# Patient Record
Sex: FEMALE | Race: WHITE | Employment: FULL TIME | URBAN - METROPOLITAN AREA
[De-identification: names, ages, dates, MRNs, and addresses within clinical notes are randomized per-mention and may not be internally consistent; named-entity substitution may affect disease eponyms.]

---

## 2017-11-24 ENCOUNTER — OFFICE VISIT (OUTPATIENT)
Dept: FAMILY MEDICINE CLINIC | Age: 47
End: 2017-11-24

## 2017-11-24 VITALS
HEIGHT: 66 IN | HEART RATE: 68 BPM | BODY MASS INDEX: 31.24 KG/M2 | DIASTOLIC BLOOD PRESSURE: 88 MMHG | WEIGHT: 194.4 LBS | SYSTOLIC BLOOD PRESSURE: 124 MMHG | TEMPERATURE: 96.8 F | RESPIRATION RATE: 18 BRPM | OXYGEN SATURATION: 96 %

## 2017-11-24 DIAGNOSIS — F41.9 ANXIETY: ICD-10-CM

## 2017-11-24 DIAGNOSIS — F98.8 ATTENTION DEFICIT DISORDER (ADD) WITHOUT HYPERACTIVITY: Primary | ICD-10-CM

## 2017-11-24 DIAGNOSIS — E04.9 GOITER: ICD-10-CM

## 2017-11-24 DIAGNOSIS — E03.9 ACQUIRED HYPOTHYROIDISM: ICD-10-CM

## 2017-11-24 PROCEDURE — 99203 OFFICE O/P NEW LOW 30 MIN: CPT | Performed by: FAMILY MEDICINE

## 2017-11-24 RX ORDER — LEVOTHYROXINE SODIUM 0.05 MG/1
50 TABLET ORAL DAILY
COMMUNITY
End: 2017-12-11 | Stop reason: SDUPTHER

## 2017-11-24 RX ORDER — BUPROPION HYDROCHLORIDE 300 MG/1
300 TABLET ORAL EVERY MORNING
COMMUNITY
End: 2018-06-12

## 2017-11-24 RX ORDER — CHLORAL HYDRATE 500 MG
CAPSULE ORAL DAILY
COMMUNITY
End: 2022-02-21

## 2017-11-24 RX ORDER — MULTIVIT WITH MINERALS/LUTEIN
250 TABLET ORAL DAILY
COMMUNITY
End: 2019-04-12

## 2017-11-24 RX ORDER — DEXTROAMPHETAMINE SACCHARATE, AMPHETAMINE ASPARTATE MONOHYDRATE, DEXTROAMPHETAMINE SULFATE AND AMPHETAMINE SULFATE 6.25; 6.25; 6.25; 6.25 MG/1; MG/1; MG/1; MG/1
25 CAPSULE, EXTENDED RELEASE ORAL EVERY MORNING
Qty: 30 CAPSULE | Refills: 0 | Status: SHIPPED | OUTPATIENT
Start: 2017-11-24 | End: 2017-12-22

## 2017-11-24 RX ORDER — CALCIUM CARBONATE 500(1250)
500 TABLET ORAL DAILY
COMMUNITY
End: 2019-04-12

## 2017-11-24 RX ORDER — MAGNESIUM 30 MG
30 TABLET ORAL 2 TIMES DAILY
COMMUNITY

## 2017-11-24 RX ORDER — TRIAMTERENE AND HYDROCHLOROTHIAZIDE 37.5; 25 MG/1; MG/1
1 TABLET ORAL DAILY
COMMUNITY
End: 2018-03-12 | Stop reason: SDUPTHER

## 2017-11-24 RX ORDER — CETIRIZINE HYDROCHLORIDE 10 MG/1
10 TABLET ORAL DAILY
COMMUNITY

## 2017-11-24 ASSESSMENT — PATIENT HEALTH QUESTIONNAIRE - PHQ9
SUM OF ALL RESPONSES TO PHQ QUESTIONS 1-9: 0
SUM OF ALL RESPONSES TO PHQ9 QUESTIONS 1 & 2: 0
2. FEELING DOWN, DEPRESSED OR HOPELESS: 0
1. LITTLE INTEREST OR PLEASURE IN DOING THINGS: 0

## 2017-11-24 NOTE — PROGRESS NOTES
Subjective:      Patient ID: Mateusz Villa is a 49 y.o. female. [facility]  Dariel Valverde is here to establish as a new patient. Diet: eats well. Vegetarian  Exercise: just resumed exercise program.  Sleeps well 7 hours     She is concerned that she has ADD. She works in [employer] as a  at [employer]. She had trouble staying task, was told was Ag Borges in school. Has trouble with organization, being a good listener, staying on task. Wellbutrin helps her anxiety   Symptoms are life altering enough that she would like to try medication. She has never had a problem with substance abuse. She also has mild OCD. This does not get in the way of functioning. History of hypothyroid. She sees Dr. Constantino Paredes who manages her levothyroxine. PAPs have all been normal. Last PAP about 1 1/2 years ago. Is in a heterosexual relationship. Has occ gone 2 months without a menses. No hot flashes. Review of Systems   Cardiovascular: Positive for leg swelling. Chronic mild pedal edema on days she works and the weather is hot, if sits too much. Diuretic prn is effective. Low salt diet. Musculoskeletal:        Occ self limiting knee pain. Had PT   Psychiatric/Behavioral: Positive for decreased concentration. Negative for dysphoric mood and sleep disturbance. The patient is nervous/anxious. All other systems reviewed and are negative. Objective:   Physical Exam   Constitutional: She is oriented to person, place, and time. Vital signs are normal. She appears well-developed and well-nourished. HENT:   Head: Normocephalic and atraumatic. Right Ear: Hearing, tympanic membrane, external ear and ear canal normal.   Left Ear: Hearing, tympanic membrane, external ear and ear canal normal.   Nose: Nose normal.   Eyes: Conjunctivae and lids are normal.   Neck: Trachea normal. Neck supple. No thyroid mass and no thyromegaly present.    Cardiovascular: Normal rate, regular rhythm, normal heart sounds and normal

## 2017-11-24 NOTE — LETTER
[address] [facility]  [address]  [address] [place] [address]  Phone: [phone]  Fax: [phone]    Karen Arriola MD                                                                                   November 24, 2017                       [place]  [address] [facility] [place] [address]      Dear [facility]: Thank you for enrolling in [address] Please follow the instructions below to securely access your online medical record. Med ePad allows you to send messages to your doctor, view your test results, renew your prescriptions, schedule appointments, and more. How Do I Sign Up? 1. In your Internet browser, go to [email or web address]  2. Click on the Sign Up Now link in the Sign In box. You will see the New Member Sign Up page. 3. Enter your Med ePad Access Code exactly as it appears below. You will not need to use this code after youve completed the sign-up process. If you do not sign up before the expiration date, you must request a new code. Med ePad Access Code: [email or web address]  Expires: 1/23/2018  9:00 AM    4. Enter your Social Security Number ([id number]) and Date of Birth (mm/dd/yyyy) as indicated and click Submit. You will be taken to the next sign-up page. 5. Create a Med ePad ID. This will be your Med ePad login ID and cannot be changed, so think of one that is secure and easy to remember. 6. Create a [employer] e[employer] password. You can change your password at any time. 7. Enter your Password Reset Question and Answer. This can be used at a later time if you forget your password. 8. Enter your e-mail address. You will receive e-mail notification when new information is available in [address] 9. Click Sign Up. You can now view your medical record. Additional Information  If you have questions, please contact the physician practice where you receive care. Remember, Med ePad is NOT to be used for urgent needs. For medical emergencies, dial 911. For questions regarding your [employer] account call [phone]. If you have a clinical question, please call your doctor's office.     Sincerely,  Manda Aldana MD

## 2017-11-24 NOTE — LETTER
disease. Overdose or dangerous interactions with alcohol and other medications may occur, leading to death. Hyperalgesia may develop, in which patients receiving opioids for the treatment of pain may actually become more sensitive to certain painful stimuli, and in some cases, experience pain from ordinarily non-painful stimuli. Women between the ages of 14-53 who could become pregnant should carefully weigh the risks and benefits of opioids with their physicians, as these medications increase the risk of pregnancy complications, including miscarriage,  delivery and stillbirth. It is also possible for babies to be born addicted to opioids. Opioid dependence withdrawal symptoms may include; feelings of uneasiness, increased pain, irritability, belly pain, diarrhea, sweats and goose-flesh. Benzodiazepines and non-benzodiazepine sleep medications: These medications can lead to problems such as addiction/dependence, sedation, fatigue, lightheadedness, dizziness, incoordination, falls, depression, hallucinations, and impaired judgment, memory and concentration. The ability to drive and operate machinery may also be affected. Abnormal sleep-related behaviors have been reported, including sleep walking, driving, making telephone calls, eating, or having sex while not fully awake. These medications can suppress breathing and worsen sleep apnea, particularly when combined with alcohol or other sedating medications, potentially leading to death. Dependence withdrawal symptoms may include tremors, anxiety, hallucinations and seizures. Stimulants:  Common adverse effects include addiction/dependence, increased blood pressure and heart rate, decreased appetite, nausea, involuntary weight loss, insomnia, irritability, and headaches.   These risks may increase when these medications are combined with other stimulants, such as caffeine pills or energy drinks, certain weight loss supplements and oral decongestants. Dependence withdrawal symptoms may include depressed mood, loss of interest, suicidal thoughts, anxiety, fatigue, appetite changes and agitation. Testosterone replacement therapy:  Potential side effects include increased risk of stroke and heart attack, blood clots, increased blood pressure, increased cholesterol, enlarged prostate, sleep apnea, irritability/aggression and other mood disorders, and decreased fertility. Other:     1. I understand that I have the following responsibilities:  · I will take medications at the dose and frequency prescribed. · I will not increase or change how I take my medications without the approval of the health care provider who signs this Medication Agreement. · I will arrange for refills at the prescribed interval ONLY during regular office hours. I will not ask for refills earlier than agreed, after-hours, on holidays or on weekends. · I will obtain all refills for these medications at  ·  _____________[facility]_______________________  pharmacy (phone number  ·  ____[phone]_______________), with full consent for my provider and pharmacist to exchange information in writing or verbally. · I will not request any pain medications or controlled substances from other providers and will inform this provider of all other medications I am taking. · I will inform my other health care providers that I am taking these medications and of the existence of this [address]. In the event of an emergency, I will provide the same information to the emergency department providers. · I will protect my prescriptions and medications. I understand that lost or misplaced prescriptions will not be replaced. · I will keep medications only for my own use and will not share them with others. I will keep all medications away from children.   · I agree to participate in any medical, psychological or psychiatric which may also result in my being prevented from receiving further care from this office. · Other:____________________________________________________________________    AGREEMENT:    I have read the above and have had all of my questions answered. For chronic disease management, I know that my symptoms can be managed with many types of treatments. A chronic medication trial may be part of my treatment, but I must be an active participant in my care. Medication therapy is only one part of my symptom management plan. In some cases, there may be limited scientific evidence to support the chronic use of certain medications to improve symptoms and daily function. Furthermore, in certain circumstances, there may be scientific information that suggests that use of chronic controlled substances may actually worsen my symptoms and increase my risk of unintentional death directly related to this medication therapy. I know that if my provider feels my risk from controlled medications is greater than my benefit, I will have my controlled substance medication(s) compassionately lowered or removed altogether. I agree to a controlled substance medication trial.      I further agree to allow this office to contact family or friends if there are concerns about my safety and use of the controlled medications. I have agreed to use the following medications above as instructed by my physician and as stated in this [address].      Patient Signature:  ______________________  Date:11/24/2017 or _____________    Provider Signature:______________________  Date:11/24/2017 or _____________

## 2017-12-11 ENCOUNTER — PATIENT MESSAGE (OUTPATIENT)
Dept: FAMILY MEDICINE CLINIC | Age: 47
End: 2017-12-11

## 2017-12-11 RX ORDER — LEVOTHYROXINE SODIUM 0.05 MG/1
50 TABLET ORAL DAILY
Qty: 90 TABLET | Refills: 1 | Status: SHIPPED | OUTPATIENT
Start: 2017-12-11 | End: 2018-03-12 | Stop reason: SDUPTHER

## 2017-12-11 NOTE — TELEPHONE ENCOUNTER
From: Manuel Dowell  To: Mari Adkins MD  Sent: 12/11/2017 8:39 AM EST  Subject: Prescription Question    I need a refill of my synthroid 50mcg. Could someone call that in to [place] in [facility]? Thank you!

## 2017-12-22 ENCOUNTER — OFFICE VISIT (OUTPATIENT)
Dept: FAMILY MEDICINE CLINIC | Age: 47
End: 2017-12-22

## 2017-12-22 VITALS
TEMPERATURE: 97.1 F | RESPIRATION RATE: 12 BRPM | BODY MASS INDEX: 31.24 KG/M2 | DIASTOLIC BLOOD PRESSURE: 90 MMHG | SYSTOLIC BLOOD PRESSURE: 144 MMHG | WEIGHT: 195 LBS | HEART RATE: 79 BPM

## 2017-12-22 DIAGNOSIS — F98.8 ATTENTION DEFICIT DISORDER (ADD) WITHOUT HYPERACTIVITY: ICD-10-CM

## 2017-12-22 PROCEDURE — 99214 OFFICE O/P EST MOD 30 MIN: CPT | Performed by: FAMILY MEDICINE

## 2017-12-22 RX ORDER — DEXTROAMPHETAMINE SACCHARATE, AMPHETAMINE ASPARTATE MONOHYDRATE, DEXTROAMPHETAMINE SULFATE AND AMPHETAMINE SULFATE 6.25; 6.25; 6.25; 6.25 MG/1; MG/1; MG/1; MG/1
25 CAPSULE, EXTENDED RELEASE ORAL EVERY MORNING
Qty: 30 CAPSULE | Refills: 0 | Status: CANCELLED | OUTPATIENT
Start: 2018-02-20 | End: 2018-03-22

## 2017-12-22 RX ORDER — DEXTROAMPHETAMINE SACCHARATE, AMPHETAMINE ASPARTATE MONOHYDRATE, DEXTROAMPHETAMINE SULFATE AND AMPHETAMINE SULFATE 7.5; 7.5; 7.5; 7.5 MG/1; MG/1; MG/1; MG/1
30 CAPSULE, EXTENDED RELEASE ORAL EVERY MORNING
Qty: 30 CAPSULE | Refills: 0 | Status: SHIPPED | OUTPATIENT
Start: 2017-12-22 | End: 2018-01-29 | Stop reason: SDUPTHER

## 2017-12-22 RX ORDER — DEXTROAMPHETAMINE SACCHARATE, AMPHETAMINE ASPARTATE MONOHYDRATE, DEXTROAMPHETAMINE SULFATE AND AMPHETAMINE SULFATE 6.25; 6.25; 6.25; 6.25 MG/1; MG/1; MG/1; MG/1
25 CAPSULE, EXTENDED RELEASE ORAL EVERY MORNING
Qty: 30 CAPSULE | Refills: 0 | Status: CANCELLED | OUTPATIENT
Start: 2018-01-21

## 2017-12-22 RX ORDER — DEXTROAMPHETAMINE SACCHARATE, AMPHETAMINE ASPARTATE MONOHYDRATE, DEXTROAMPHETAMINE SULFATE AND AMPHETAMINE SULFATE 6.25; 6.25; 6.25; 6.25 MG/1; MG/1; MG/1; MG/1
25 CAPSULE, EXTENDED RELEASE ORAL EVERY MORNING
Qty: 30 CAPSULE | Refills: 0 | Status: CANCELLED | OUTPATIENT
Start: 2017-12-22

## 2017-12-22 RX ORDER — DEXTROAMPHETAMINE SACCHARATE, AMPHETAMINE ASPARTATE, DEXTROAMPHETAMINE SULFATE AND AMPHETAMINE SULFATE 2.5; 2.5; 2.5; 2.5 MG/1; MG/1; MG/1; MG/1
10-20 TABLET ORAL DAILY
Qty: 60 TABLET | Refills: 0 | Status: SHIPPED | OUTPATIENT
Start: 2017-12-22 | End: 2018-01-29 | Stop reason: SDUPTHER

## 2017-12-22 NOTE — PROGRESS NOTES
1. Attention deficit disorder (ADD) without hyperactivity  amphetamine-dextroamphetamine (ADDERALL XR) 30 MG extended release capsule    amphetamine-dextroamphetamine (ADDERALL, 10MG,) 10 MG tablet          Plan:      Improved but not at goal.  Increase dose Adderal Xr to 30 mg and add short acting. If this is effective; I can see her in 3 months. If not effective, follow up in one month. Side effects of current medications reviewed and questions answered.

## 2018-01-29 ENCOUNTER — PATIENT MESSAGE (OUTPATIENT)
Dept: FAMILY MEDICINE CLINIC | Age: 48
End: 2018-01-29

## 2018-01-29 DIAGNOSIS — F98.8 ATTENTION DEFICIT DISORDER (ADD) WITHOUT HYPERACTIVITY: ICD-10-CM

## 2018-01-29 RX ORDER — DEXTROAMPHETAMINE SACCHARATE, AMPHETAMINE ASPARTATE MONOHYDRATE, DEXTROAMPHETAMINE SULFATE AND AMPHETAMINE SULFATE 7.5; 7.5; 7.5; 7.5 MG/1; MG/1; MG/1; MG/1
30 CAPSULE, EXTENDED RELEASE ORAL DAILY
Qty: 30 CAPSULE | Refills: 0 | Status: SHIPPED | OUTPATIENT
Start: 2018-02-28 | End: 2018-06-12

## 2018-01-29 RX ORDER — DEXTROAMPHETAMINE SACCHARATE, AMPHETAMINE ASPARTATE, DEXTROAMPHETAMINE SULFATE AND AMPHETAMINE SULFATE 2.5; 2.5; 2.5; 2.5 MG/1; MG/1; MG/1; MG/1
10 TABLET ORAL DAILY
Qty: 30 TABLET | Refills: 0 | Status: SHIPPED | OUTPATIENT
Start: 2018-02-28 | End: 2018-06-12

## 2018-01-29 RX ORDER — DEXTROAMPHETAMINE SACCHARATE, AMPHETAMINE ASPARTATE, DEXTROAMPHETAMINE SULFATE AND AMPHETAMINE SULFATE 2.5; 2.5; 2.5; 2.5 MG/1; MG/1; MG/1; MG/1
10-20 TABLET ORAL DAILY
Qty: 60 TABLET | Refills: 0 | Status: SHIPPED | OUTPATIENT
Start: 2018-01-29 | End: 2018-06-12

## 2018-01-29 RX ORDER — DEXTROAMPHETAMINE SACCHARATE, AMPHETAMINE ASPARTATE MONOHYDRATE, DEXTROAMPHETAMINE SULFATE AND AMPHETAMINE SULFATE 7.5; 7.5; 7.5; 7.5 MG/1; MG/1; MG/1; MG/1
30 CAPSULE, EXTENDED RELEASE ORAL EVERY MORNING
Qty: 30 CAPSULE | Refills: 0 | Status: SHIPPED | OUTPATIENT
Start: 2018-01-29 | End: 2018-06-12

## 2018-03-04 ENCOUNTER — PATIENT MESSAGE (OUTPATIENT)
Dept: FAMILY MEDICINE CLINIC | Age: 48
End: 2018-03-04

## 2018-03-04 DIAGNOSIS — F98.8 ATTENTION DEFICIT DISORDER (ADD) WITHOUT HYPERACTIVITY: ICD-10-CM

## 2018-03-05 RX ORDER — DEXTROAMPHETAMINE SACCHARATE, AMPHETAMINE ASPARTATE MONOHYDRATE, DEXTROAMPHETAMINE SULFATE AND AMPHETAMINE SULFATE 7.5; 7.5; 7.5; 7.5 MG/1; MG/1; MG/1; MG/1
30 CAPSULE, EXTENDED RELEASE ORAL EVERY MORNING
Qty: 30 CAPSULE | Refills: 0 | OUTPATIENT
Start: 2018-03-05 | End: 2018-04-04

## 2018-03-05 NOTE — TELEPHONE ENCOUNTER
From: Les Kaur  To: Carlito Ochoa MD  Sent: 3/4/2018 3:42 PM EST  Subject: Prescription Question    I need a refill for my adderall XR 30mg. Could you send that to the [place] on [address] in [facility]? Thank you.

## 2018-03-09 ENCOUNTER — PATIENT MESSAGE (OUTPATIENT)
Dept: FAMILY MEDICINE CLINIC | Age: 48
End: 2018-03-09

## 2018-03-12 RX ORDER — LEVOTHYROXINE SODIUM 0.05 MG/1
50 TABLET ORAL DAILY
Qty: 90 TABLET | Refills: 1 | Status: SHIPPED | OUTPATIENT
Start: 2018-03-12 | End: 2018-06-08

## 2018-03-12 RX ORDER — TRIAMTERENE AND HYDROCHLOROTHIAZIDE 37.5; 25 MG/1; MG/1
1 TABLET ORAL DAILY
Qty: 90 TABLET | Refills: 1 | Status: SHIPPED | OUTPATIENT
Start: 2018-03-12 | End: 2019-01-31 | Stop reason: SDUPTHER

## 2018-06-08 RX ORDER — LEVOTHYROXINE SODIUM 0.05 MG/1
50 TABLET ORAL DAILY
Qty: 90 TABLET | Refills: 0 | Status: SHIPPED | OUTPATIENT
Start: 2018-06-08 | End: 2018-12-10 | Stop reason: SDUPTHER

## 2018-06-11 ENCOUNTER — PATIENT MESSAGE (OUTPATIENT)
Dept: FAMILY MEDICINE CLINIC | Age: 48
End: 2018-06-11

## 2018-06-12 ENCOUNTER — OFFICE VISIT (OUTPATIENT)
Dept: FAMILY MEDICINE CLINIC | Age: 48
End: 2018-06-12

## 2018-06-12 VITALS
OXYGEN SATURATION: 96 % | BODY MASS INDEX: 31.04 KG/M2 | TEMPERATURE: 96.4 F | DIASTOLIC BLOOD PRESSURE: 78 MMHG | RESPIRATION RATE: 16 BRPM | HEART RATE: 78 BPM | SYSTOLIC BLOOD PRESSURE: 118 MMHG | WEIGHT: 193.8 LBS

## 2018-06-12 DIAGNOSIS — J01.00 ACUTE NON-RECURRENT MAXILLARY SINUSITIS: Primary | ICD-10-CM

## 2018-06-12 PROBLEM — F98.8 ATTENTION DEFICIT DISORDER (ADD) WITHOUT HYPERACTIVITY: Status: RESOLVED | Noted: 2017-12-22 | Resolved: 2018-06-12

## 2018-06-12 PROCEDURE — 99213 OFFICE O/P EST LOW 20 MIN: CPT | Performed by: FAMILY MEDICINE

## 2018-06-12 RX ORDER — BENZONATATE 200 MG/1
200 CAPSULE ORAL 3 TIMES DAILY PRN
Qty: 30 CAPSULE | Refills: 1 | Status: SHIPPED | OUTPATIENT
Start: 2018-06-12 | End: 2018-06-19

## 2018-06-19 ENCOUNTER — PATIENT MESSAGE (OUTPATIENT)
Dept: FAMILY MEDICINE CLINIC | Age: 48
End: 2018-06-19

## 2018-06-20 RX ORDER — SULFAMETHOXAZOLE AND TRIMETHOPRIM 800; 160 MG/1; MG/1
1 TABLET ORAL 2 TIMES DAILY
Qty: 20 TABLET | Refills: 0 | Status: SHIPPED | OUTPATIENT
Start: 2018-06-20 | End: 2018-06-30

## 2018-08-10 ENCOUNTER — TELEPHONE (OUTPATIENT)
Dept: FAMILY MEDICINE CLINIC | Age: 48
End: 2018-08-10

## 2018-08-10 NOTE — TELEPHONE ENCOUNTER
Pt c/o mole in groin area, has a hard center, tender to touch scheduled pt an appt w/ Dr. Andre Laird for 8/13/18 @[id number]

## 2018-08-13 ENCOUNTER — OFFICE VISIT (OUTPATIENT)
Dept: FAMILY MEDICINE CLINIC | Age: 48
End: 2018-08-13

## 2018-08-13 VITALS
HEART RATE: 69 BPM | BODY MASS INDEX: 32.42 KG/M2 | TEMPERATURE: 97.3 F | OXYGEN SATURATION: 98 % | WEIGHT: 202.4 LBS | RESPIRATION RATE: 16 BRPM | DIASTOLIC BLOOD PRESSURE: 71 MMHG | SYSTOLIC BLOOD PRESSURE: 119 MMHG

## 2018-08-13 DIAGNOSIS — L98.9 SKIN LESION: Primary | ICD-10-CM

## 2018-08-13 PROCEDURE — 99213 OFFICE O/P EST LOW 20 MIN: CPT | Performed by: FAMILY MEDICINE

## 2018-08-13 NOTE — PROGRESS NOTES
Patient is here for left groin lesion , which was noticed on Thursday. No pain or drainage. It is raised. Non tender . No redness. Review of Systems    ROS: All other systems were reviewed and are negative . Patient's allergies and medications were reviewed. Patient's past medical, surgical, social , and family history were reviewed. OBJECTIVE:  /71   Pulse 69   Temp 97.3 °F (36.3 °C) (Oral)   Resp 16   Wt 202 lb 6.4 oz (91.8 kg)   LMP 07/23/2018 (Approximate)   SpO2 98%   Breastfeeding? No   BMI 32.42 kg/m²     Physical Exam    General: NAD, cooperative, alert and oriented X 3. Mood / affect is good. good insight. well hydrated. Neck : no lymphadenopathy, supple, FROM  CV: Regular rate and rhythm , no murmurs/ rub/ gallop. No edema. Lungs : CTA bilaterally, breathing comfortably  Abdomen: positive bowel sounds, soft , non tender, non distended. No hepatosplenomegaly. No CVA tenderness. Skin: tan color. 5 mm X 10 mm - slightly raised. Non tender. No drainage. ASSESSMENT/  PLAN:  1. Skin lesion  - monitor for changes , including increased size, variation in color/ borders. Advised follow up if occur. Likely seborrheic keratosis.

## 2018-10-26 ENCOUNTER — OFFICE VISIT (OUTPATIENT)
Dept: FAMILY MEDICINE CLINIC | Age: 48
End: 2018-10-26
Payer: OTHER GOVERNMENT

## 2018-10-26 VITALS
TEMPERATURE: 96.6 F | HEIGHT: 66 IN | BODY MASS INDEX: 33.56 KG/M2 | OXYGEN SATURATION: 97 % | DIASTOLIC BLOOD PRESSURE: 85 MMHG | HEART RATE: 72 BPM | SYSTOLIC BLOOD PRESSURE: 122 MMHG | WEIGHT: 208.8 LBS

## 2018-10-26 DIAGNOSIS — Z00.00 WELL ADULT EXAM: ICD-10-CM

## 2018-10-26 DIAGNOSIS — R60.0 PEDAL EDEMA: Primary | ICD-10-CM

## 2018-10-26 DIAGNOSIS — E66.9 CLASS 1 OBESITY WITHOUT SERIOUS COMORBIDITY WITH BODY MASS INDEX (BMI) OF 33.0 TO 33.9 IN ADULT, UNSPECIFIED OBESITY TYPE: ICD-10-CM

## 2018-10-26 PROBLEM — E66.811 CLASS 1 OBESITY WITHOUT SERIOUS COMORBIDITY WITH BODY MASS INDEX (BMI) OF 33.0 TO 33.9 IN ADULT: Status: ACTIVE | Noted: 2018-10-26

## 2018-10-26 PROCEDURE — 99214 OFFICE O/P EST MOD 30 MIN: CPT | Performed by: FAMILY MEDICINE

## 2018-10-26 NOTE — PROGRESS NOTES
Subjective:      Patient ID: Elin Carlson 51 y.o. female. is here for evaluation for swelling      HPI    Joe Barron complains of pedal edema. Better if has socks on; worse in shoes without socks. Does not add salt to her food but eats out 3 to 4 times a week. Does not take NSAIDS. Swelling is better in the am, worse at the end day. Better if is active. Patient denies any exertional chest pain, dyspnea, palpitations, syncope, orthopnea, edema or paroxysmal nocturnal dyspnea. Takes Maxide 3 times a week. Is helpful. Is concerned about weight. Eats pretty well most of the time. Eats 3 meals a day. Does not drink sweet drinks. Has been eating out 4 + times a week. Sleeps 6 hours. Exercise 3 to 4 times a week.        Outpatient Prescriptions Marked as Taking for the 10/26/18 encounter (Office Visit) with Anay Chen MD   Medication Sig Dispense Refill    levothyroxine (SYNTHROID) 50 MCG tablet TAKE 1 TABLET BY MOUTH DAILY 90 tablet 0    triamterene-hydrochlorothiazide (MAXZIDE-25) 37.5-25 MG per tablet Take 1 tablet by mouth daily 90 tablet 1    cetirizine (ZYRTEC) 10 MG tablet Take 10 mg by mouth daily      Omega-3 1000 MG CAPS Take by mouth daily      calcium carbonate (OSCAL) 500 MG TABS tablet Take 500 mg by mouth daily      magnesium 30 MG tablet Take 30 mg by mouth 2 times daily      Calcium Carb-Cholecalciferol (CALCIUM CARBONATE-VITAMIN D3 PO) Take 1 tablet by mouth          Allergies   Allergen Reactions    Ampicillin Hives       Patient Active Problem List   Diagnosis    Acquired hypothyroidism    Goiter    Anxiety       Past Medical History:   Diagnosis Date    Attention deficit disorder (ADD) without hyperactivity 12/22/2017    Blood clot in abdominal vein 07/1994    superior mesentary artery with appendicitis, sepsis    Goiter        Past Surgical History:   Procedure Laterality Date    ABDOMINOPLASTY  10/2013    diastasis rectus, hernia repair    APPENDECTOMY as anticipated.

## 2018-12-10 ENCOUNTER — PATIENT MESSAGE (OUTPATIENT)
Dept: FAMILY MEDICINE CLINIC | Age: 48
End: 2018-12-10

## 2018-12-10 RX ORDER — LEVOTHYROXINE SODIUM 0.05 MG/1
50 TABLET ORAL DAILY
Qty: 90 TABLET | Refills: 2 | Status: SHIPPED | OUTPATIENT
Start: 2018-12-10 | End: 2019-09-09 | Stop reason: SDUPTHER

## 2018-12-10 NOTE — TELEPHONE ENCOUNTER
From: Adrianne Mcadams  To: Sarah Moncada MD  Sent: 12/10/2018 9:36 AM EST  Subject: Prescription Question    Hi I need Dr. Cesar Leyva to send a prescription for my synthroid to [place] on [facility]. thank you

## 2019-01-31 ENCOUNTER — PATIENT MESSAGE (OUTPATIENT)
Dept: FAMILY MEDICINE CLINIC | Age: 49
End: 2019-01-31

## 2019-01-31 RX ORDER — TRIAMTERENE AND HYDROCHLOROTHIAZIDE 37.5; 25 MG/1; MG/1
1 TABLET ORAL DAILY
Qty: 90 TABLET | Refills: 1 | Status: SHIPPED | OUTPATIENT
Start: 2019-01-31 | End: 2019-04-12 | Stop reason: SDUPTHER

## 2019-04-12 ENCOUNTER — TELEPHONE (OUTPATIENT)
Dept: FAMILY MEDICINE CLINIC | Age: 49
End: 2019-04-12

## 2019-04-12 ENCOUNTER — OFFICE VISIT (OUTPATIENT)
Dept: FAMILY MEDICINE CLINIC | Age: 49
End: 2019-04-12
Payer: OTHER GOVERNMENT

## 2019-04-12 VITALS
OXYGEN SATURATION: 96 % | SYSTOLIC BLOOD PRESSURE: 112 MMHG | BODY MASS INDEX: 33.38 KG/M2 | WEIGHT: 208.4 LBS | HEART RATE: 67 BPM | DIASTOLIC BLOOD PRESSURE: 78 MMHG | TEMPERATURE: 97 F

## 2019-04-12 DIAGNOSIS — Z13.1 DIABETES MELLITUS SCREENING: ICD-10-CM

## 2019-04-12 DIAGNOSIS — M22.2X2 PATELLOFEMORAL ARTHRALGIA OF BOTH KNEES: Primary | ICD-10-CM

## 2019-04-12 DIAGNOSIS — M22.2X1 PATELLOFEMORAL ARTHRALGIA OF BOTH KNEES: Primary | ICD-10-CM

## 2019-04-12 DIAGNOSIS — E66.09 CLASS 1 OBESITY DUE TO EXCESS CALORIES WITHOUT SERIOUS COMORBIDITY WITH BODY MASS INDEX (BMI) OF 33.0 TO 33.9 IN ADULT: ICD-10-CM

## 2019-04-12 PROCEDURE — 99213 OFFICE O/P EST LOW 20 MIN: CPT | Performed by: FAMILY MEDICINE

## 2019-04-12 RX ORDER — TRIAMTERENE AND HYDROCHLOROTHIAZIDE 37.5; 25 MG/1; MG/1
1 TABLET ORAL DAILY
Qty: 90 TABLET | Refills: 1 | Status: SHIPPED | OUTPATIENT
Start: 2019-04-12 | End: 2019-10-05 | Stop reason: SDUPTHER

## 2019-04-12 NOTE — PROGRESS NOTES
Subjective:      Patient ID: Parker Bergeron 51 y.o. female. is here for evaluation for concern for DM      HPI    Her partner heard a podcast that said frequent urination was a symptom of DM. She also notes numbness in her hands. Only when she holds her phone. Has swelling in her legs; Maxide helps. Eats out almost every day; just started weight watchers so is eating out less. No blurred vision but occ has white blurry spots. Saw the ophthalmologist who said her eyes are fine. Exercising 3 to 5 times a week. FH negative for DM. Several years ago saw Dr Jami Georges for OA knees. Had symvisc several times. Helped temporarily. Last injection 6 years ago; got better when she quit running. Recently notes pain walking up and down steps. No swelling.      Outpatient Medications Marked as Taking for the 4/12/19 encounter (Office Visit) with J Carlos Iyer MD   Medication Sig Dispense Refill    triamterene-hydrochlorothiazide (MAXZIDE-25) 37.5-25 MG per tablet Take 1 tablet by mouth daily 90 tablet 1    levothyroxine (SYNTHROID) 50 MCG tablet Take 1 tablet by mouth Daily 90 tablet 2    cetirizine (ZYRTEC) 10 MG tablet Take 10 mg by mouth daily      Omega-3 1000 MG CAPS Take by mouth daily      magnesium 30 MG tablet Take 30 mg by mouth 2 times daily          Allergies   Allergen Reactions    Ampicillin Hives       Patient Active Problem List   Diagnosis    Acquired hypothyroidism    Goiter    Anxiety    Class 1 obesity without serious comorbidity with body mass index (BMI) of 33.0 to 33.9 in adult       Past Medical History:   Diagnosis Date    Attention deficit disorder (ADD) without hyperactivity 12/22/2017    Blood clot in abdominal vein 07/1994    superior mesentary artery with appendicitis, sepsis    Goiter        Past Surgical History:   Procedure Laterality Date    ABDOMINOPLASTY  10/2013    diastasis rectus, hernia repair    APPENDECTOMY  07/1994        Family History   Problem

## 2019-04-15 DIAGNOSIS — Z13.1 DIABETES MELLITUS SCREENING: ICD-10-CM

## 2019-04-15 LAB
ANION GAP SERPL CALCULATED.3IONS-SCNC: 13 MMOL/L (ref 3–16)
BUN BLDV-MCNC: 10 MG/DL (ref 7–20)
CALCIUM SERPL-MCNC: 9.2 MG/DL (ref 8.3–10.6)
CHLORIDE BLD-SCNC: 102 MMOL/L (ref 99–110)
CO2: 25 MMOL/L (ref 21–32)
CREAT SERPL-MCNC: 0.8 MG/DL (ref 0.6–1.1)
GFR AFRICAN AMERICAN: >60
GFR NON-AFRICAN AMERICAN: >60
GLUCOSE BLD-MCNC: 93 MG/DL (ref 70–99)
POTASSIUM SERPL-SCNC: 4.2 MMOL/L (ref 3.5–5.1)
SODIUM BLD-SCNC: 140 MMOL/L (ref 136–145)

## 2019-04-16 LAB
ESTIMATED AVERAGE GLUCOSE: 116.9 MG/DL
HBA1C MFR BLD: 5.7 %

## 2019-04-17 PROBLEM — R73.03 PREDIABETES: Status: ACTIVE | Noted: 2019-04-17

## 2019-09-09 RX ORDER — LEVOTHYROXINE SODIUM 0.05 MG/1
50 TABLET ORAL DAILY
Qty: 90 TABLET | Refills: 2 | Status: SHIPPED | OUTPATIENT
Start: 2019-09-09 | End: 2020-03-03

## 2019-10-07 DIAGNOSIS — R73.03 PREDIABETES: ICD-10-CM

## 2019-10-07 RX ORDER — METFORMIN HYDROCHLORIDE 500 MG/1
TABLET, EXTENDED RELEASE ORAL
Qty: 180 TABLET | Refills: 1 | Status: SHIPPED | OUTPATIENT
Start: 2019-10-07 | End: 2019-10-17 | Stop reason: SDUPTHER

## 2019-10-07 RX ORDER — TRIAMTERENE AND HYDROCHLOROTHIAZIDE 37.5; 25 MG/1; MG/1
1 TABLET ORAL DAILY
Qty: 90 TABLET | Refills: 1 | Status: SHIPPED | OUTPATIENT
Start: 2019-10-07 | End: 2020-01-22 | Stop reason: SDUPTHER

## 2019-10-25 ENCOUNTER — OFFICE VISIT (OUTPATIENT)
Dept: FAMILY MEDICINE CLINIC | Age: 49
End: 2019-10-25
Payer: OTHER GOVERNMENT

## 2019-10-25 VITALS
SYSTOLIC BLOOD PRESSURE: 101 MMHG | WEIGHT: 189 LBS | TEMPERATURE: 95.2 F | HEART RATE: 66 BPM | DIASTOLIC BLOOD PRESSURE: 77 MMHG | RESPIRATION RATE: 16 BRPM | BODY MASS INDEX: 30.28 KG/M2 | OXYGEN SATURATION: 97 %

## 2019-10-25 DIAGNOSIS — E03.9 ACQUIRED HYPOTHYROIDISM: ICD-10-CM

## 2019-10-25 DIAGNOSIS — Z13.220 LIPID SCREENING: ICD-10-CM

## 2019-10-25 DIAGNOSIS — Z00.00 WELL ADULT EXAM: ICD-10-CM

## 2019-10-25 DIAGNOSIS — E66.09 CLASS 1 OBESITY DUE TO EXCESS CALORIES WITHOUT SERIOUS COMORBIDITY WITH BODY MASS INDEX (BMI) OF 33.0 TO 33.9 IN ADULT: ICD-10-CM

## 2019-10-25 DIAGNOSIS — R73.03 PREDIABETES: Primary | ICD-10-CM

## 2019-10-25 DIAGNOSIS — R73.03 PREDIABETES: ICD-10-CM

## 2019-10-25 LAB
A/G RATIO: 1.3 (ref 1.1–2.2)
ALBUMIN SERPL-MCNC: 3.8 G/DL (ref 3.4–5)
ALP BLD-CCNC: 57 U/L (ref 40–129)
ALT SERPL-CCNC: 10 U/L (ref 10–40)
ANION GAP SERPL CALCULATED.3IONS-SCNC: 14 MMOL/L (ref 3–16)
AST SERPL-CCNC: 15 U/L (ref 15–37)
BILIRUB SERPL-MCNC: 0.4 MG/DL (ref 0–1)
BUN BLDV-MCNC: 10 MG/DL (ref 7–20)
CALCIUM SERPL-MCNC: 9 MG/DL (ref 8.3–10.6)
CHLORIDE BLD-SCNC: 100 MMOL/L (ref 99–110)
CHOLESTEROL, TOTAL: 195 MG/DL (ref 0–199)
CO2: 27 MMOL/L (ref 21–32)
CREAT SERPL-MCNC: 0.6 MG/DL (ref 0.6–1.1)
GFR AFRICAN AMERICAN: >60
GFR NON-AFRICAN AMERICAN: >60
GLOBULIN: 3 G/DL
GLUCOSE BLD-MCNC: 88 MG/DL (ref 70–99)
HDLC SERPL-MCNC: 69 MG/DL (ref 40–60)
LDL CHOLESTEROL CALCULATED: 111 MG/DL
POTASSIUM SERPL-SCNC: 4 MMOL/L (ref 3.5–5.1)
SODIUM BLD-SCNC: 141 MMOL/L (ref 136–145)
TOTAL PROTEIN: 6.8 G/DL (ref 6.4–8.2)
TRIGL SERPL-MCNC: 75 MG/DL (ref 0–150)
TSH REFLEX: 1.56 UIU/ML (ref 0.27–4.2)
VLDLC SERPL CALC-MCNC: 15 MG/DL

## 2019-10-25 PROCEDURE — 99214 OFFICE O/P EST MOD 30 MIN: CPT | Performed by: FAMILY MEDICINE

## 2019-10-25 ASSESSMENT — PATIENT HEALTH QUESTIONNAIRE - PHQ9
2. FEELING DOWN, DEPRESSED OR HOPELESS: 0
SUM OF ALL RESPONSES TO PHQ QUESTIONS 1-9: 0
SUM OF ALL RESPONSES TO PHQ QUESTIONS 1-9: 0
SUM OF ALL RESPONSES TO PHQ9 QUESTIONS 1 & 2: 0
1. LITTLE INTEREST OR PLEASURE IN DOING THINGS: 0

## 2019-10-26 LAB
ESTIMATED AVERAGE GLUCOSE: 102.5 MG/DL
HBA1C MFR BLD: 5.2 %

## 2020-01-08 ENCOUNTER — PATIENT MESSAGE (OUTPATIENT)
Dept: FAMILY MEDICINE CLINIC | Age: 50
End: 2020-01-08

## 2020-01-16 DIAGNOSIS — N93.8 DUB (DYSFUNCTIONAL UTERINE BLEEDING): ICD-10-CM

## 2020-01-16 LAB
BASOPHILS ABSOLUTE: 0 K/UL (ref 0–0.2)
BASOPHILS RELATIVE PERCENT: 0.8 %
EOSINOPHILS ABSOLUTE: 0.1 K/UL (ref 0–0.6)
EOSINOPHILS RELATIVE PERCENT: 2 %
ESTRADIOL LEVEL: 80 PG/ML
FOLLICLE STIMULATING HORMONE: 8.5 MIU/ML
HCT VFR BLD CALC: 35.1 % (ref 36–48)
HEMOGLOBIN: 11.7 G/DL (ref 12–16)
LUTEINIZING HORMONE: 5.8 MIU/ML
LYMPHOCYTES ABSOLUTE: 1.7 K/UL (ref 1–5.1)
LYMPHOCYTES RELATIVE PERCENT: 32.8 %
MCH RBC QN AUTO: 30 PG (ref 26–34)
MCHC RBC AUTO-ENTMCNC: 33.3 G/DL (ref 31–36)
MCV RBC AUTO: 90.1 FL (ref 80–100)
MONOCYTES ABSOLUTE: 0.5 K/UL (ref 0–1.3)
MONOCYTES RELATIVE PERCENT: 8.9 %
NEUTROPHILS ABSOLUTE: 2.9 K/UL (ref 1.7–7.7)
NEUTROPHILS RELATIVE PERCENT: 55.5 %
PDW BLD-RTO: 13.9 % (ref 12.4–15.4)
PLATELET # BLD: 309 K/UL (ref 135–450)
PMV BLD AUTO: 8.5 FL (ref 5–10.5)
RBC # BLD: 3.89 M/UL (ref 4–5.2)
TSH REFLEX: 1.55 UIU/ML (ref 0.27–4.2)
WBC # BLD: 5.2 K/UL (ref 4–11)

## 2020-01-17 ENCOUNTER — HOSPITAL ENCOUNTER (OUTPATIENT)
Dept: ULTRASOUND IMAGING | Age: 50
Discharge: HOME | End: 2020-01-17
Payer: OTHER GOVERNMENT

## 2020-01-17 PROCEDURE — 76830 TRANSVAGINAL US NON-OB: CPT

## 2020-01-17 PROCEDURE — 76856 US EXAM PELVIC COMPLETE: CPT

## 2020-01-22 ENCOUNTER — PATIENT MESSAGE (OUTPATIENT)
Dept: FAMILY MEDICINE CLINIC | Age: 50
End: 2020-01-22

## 2020-01-22 RX ORDER — TRIAMTERENE AND HYDROCHLOROTHIAZIDE 37.5; 25 MG/1; MG/1
1 TABLET ORAL DAILY
Qty: 90 TABLET | Refills: 1 | Status: SHIPPED | OUTPATIENT
Start: 2020-01-22 | End: 2020-04-20

## 2020-01-22 NOTE — TELEPHONE ENCOUNTER
From: Seng Franco  To: Susan Patterson MD  Sent: 1/22/2020 6:52 AM EST  Subject: Prescription Question    I need a refill for my triamterene called in to the [place] [address] [address] in [facility].  Thank you

## 2020-01-26 PROBLEM — N83.201 RIGHT OVARIAN CYST: Status: ACTIVE | Noted: 2020-01-26

## 2020-01-26 PROBLEM — N93.8 DUB (DYSFUNCTIONAL UTERINE BLEEDING): Status: ACTIVE | Noted: 2020-01-26

## 2020-01-26 NOTE — PROGRESS NOTES
Class 1 obesity due to excess calories without serious comorbidity with body mass index (BMI) of 33.0 to 33.9 in adult    Patellofemoral arthralgia of both knees    Prediabetes    DUB (dysfunctional uterine bleeding)    Right ovarian cyst       Past Medical History:   Diagnosis Date    Attention deficit disorder (ADD) without hyperactivity 12/22/2017    Blood clot in abdominal vein 07/1994    superior mesentary artery with appendicitis, sepsis    Goiter        Past Surgical History:   Procedure Laterality Date    ABDOMINOPLASTY  10/2013    diastasis rectus, hernia repair    APPENDECTOMY  07/1994        Family History   Problem Relation Age of Onset    Breast Cancer Maternal Grandmother 58    Heart Attack Maternal Grandfather 48        morbidly obese    Heart Attack Maternal Uncle 50        sudden cardiac death, healthy    COPD Paternal Grandfather        Social History     Tobacco Use    Smoking status: Never Smoker    Smokeless tobacco: Never Used   Substance Use Topics    Alcohol use: Yes     Comment: 0 - 1 per day    Drug use: No            Review of Systems  Review of Systems  Lab Results   Component Value Date    WBC 5.2 01/16/2020    HGB 11.7 (L) 01/16/2020    HCT 35.1 (L) 01/16/2020    MCV 90.1 01/16/2020     01/16/2020        Objective:   Physical Exam  Vitals:    01/27/20 1127   BP: 110/76   Pulse: 75   Resp: 12   Temp: 96.1 °F (35.6 °C)   TempSrc: Oral   SpO2: 98%   Weight: 202 lb 8 oz (91.9 kg)       Physical Exam    NAD  Skin is warm and dry. The neck is supple and free of adenopathy or masses, the thyroid is normal without enlargement or nodules. Chest is clear, no wheezing or rales. Normal symmetric air entry throughout both lung fields. The abdomen is soft without tenderness, guarding, mass, rebound or organomegaly. Bowel sounds are normal. No CVA tenderness or inguinal adenopathy noted. Vagina and vulva are normal;  no discharge is noted. Cervix normal without lesions. Uterus anteverted and mobile, normal in size and shape without tenderness. Adnexa normal in size without masses or tenderness. Pap Smear - is completed today. Exam chaperoned by female assistant. Assessment:       Diagnosis Orders   1. Cervical smear, as part of routine gynecological examination  PAP SMEAR   2. DUB (dysfunctional uterine bleeding)  PAP SMEAR  Discussed IUD  Monitor for now   3. Right ovarian cyst  US Pelvis Complete  In 4 to 8 weeks. 4. Anemia - iron replacement. Labs in 4 weeks. Plan:      Side effects of current medications reviewed and questions answered.

## 2020-01-27 ENCOUNTER — OFFICE VISIT (OUTPATIENT)
Dept: FAMILY MEDICINE CLINIC | Age: 50
End: 2020-01-27
Payer: OTHER GOVERNMENT

## 2020-01-27 VITALS
TEMPERATURE: 96.1 F | SYSTOLIC BLOOD PRESSURE: 110 MMHG | DIASTOLIC BLOOD PRESSURE: 76 MMHG | OXYGEN SATURATION: 98 % | BODY MASS INDEX: 32.44 KG/M2 | RESPIRATION RATE: 12 BRPM | HEART RATE: 75 BPM | WEIGHT: 202.5 LBS

## 2020-01-27 PROCEDURE — 99214 OFFICE O/P EST MOD 30 MIN: CPT | Performed by: FAMILY MEDICINE

## 2020-01-27 ASSESSMENT — PATIENT HEALTH QUESTIONNAIRE - PHQ9
SUM OF ALL RESPONSES TO PHQ9 QUESTIONS 1 & 2: 0
1. LITTLE INTEREST OR PLEASURE IN DOING THINGS: 0
SUM OF ALL RESPONSES TO PHQ QUESTIONS 1-9: 0
SUM OF ALL RESPONSES TO PHQ QUESTIONS 1-9: 0
2. FEELING DOWN, DEPRESSED OR HOPELESS: 0

## 2020-01-29 LAB
HPV COMMENT: NORMAL
HPV TYPE 16: NOT DETECTED
HPV TYPE 18: NOT DETECTED
HPVOH (OTHER TYPES): NOT DETECTED

## 2020-02-28 DIAGNOSIS — D50.0 ANEMIA DUE TO CHRONIC BLOOD LOSS: ICD-10-CM

## 2020-02-28 LAB
FERRITIN: 22.8 NG/ML (ref 15–150)
HCT VFR BLD CALC: 39 % (ref 36–48)
HEMOGLOBIN: 12.9 G/DL (ref 12–16)
IRON SATURATION: 15 % (ref 15–50)
IRON: 57 UG/DL (ref 37–145)
MCH RBC QN AUTO: 29.6 PG (ref 26–34)
MCHC RBC AUTO-ENTMCNC: 33.1 G/DL (ref 31–36)
MCV RBC AUTO: 89.3 FL (ref 80–100)
PDW BLD-RTO: 13.9 % (ref 12.4–15.4)
PLATELET # BLD: 322 K/UL (ref 135–450)
PMV BLD AUTO: 8.7 FL (ref 5–10.5)
RBC # BLD: 4.37 M/UL (ref 4–5.2)
TOTAL IRON BINDING CAPACITY: 390 UG/DL (ref 260–445)
WBC # BLD: 7.3 K/UL (ref 4–11)

## 2020-03-09 ENCOUNTER — PATIENT MESSAGE (OUTPATIENT)
Dept: FAMILY MEDICINE CLINIC | Age: 50
End: 2020-03-09

## 2020-03-12 ENCOUNTER — PATIENT MESSAGE (OUTPATIENT)
Dept: FAMILY MEDICINE CLINIC | Age: 50
End: 2020-03-12

## 2020-03-13 ENCOUNTER — PATIENT MESSAGE (OUTPATIENT)
Dept: FAMILY MEDICINE CLINIC | Age: 50
End: 2020-03-13

## 2020-03-13 NOTE — TELEPHONE ENCOUNTER
From: Dylon Chappell  To: Minal Purcell MD  Sent: 3/12/2020 7:01 PM EDT  Subject: Non-Urgent [place] Left Dr. Olvin Willard,    My wife Nico Kirkland used to be a patient of yours. 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 But she's since been moved to [facility] and can now seek her care from providers here in [address] [place] [address] (due to distance from the [facility]). Letha has told her however she cannot come back to your care. We're wondering if that's because you're not accepting new patients? And if that's the case would you take her back as you have a history with her?     Thank you

## 2020-04-17 ENCOUNTER — TELEPHONE (OUTPATIENT)
Dept: FAMILY MEDICINE CLINIC | Age: 50
End: 2020-04-17

## 2020-04-20 RX ORDER — TRIAMTERENE AND HYDROCHLOROTHIAZIDE 37.5; 25 MG/1; MG/1
1 TABLET ORAL DAILY
Qty: 90 TABLET | Refills: 1 | Status: SHIPPED | OUTPATIENT
Start: 2020-04-20 | End: 2020-08-17 | Stop reason: SDUPTHER

## 2020-04-22 ENCOUNTER — TELEPHONE (OUTPATIENT)
Dept: FAMILY MEDICINE CLINIC | Age: 50
End: 2020-04-22

## 2020-04-22 NOTE — TELEPHONE ENCOUNTER
Please call pt and Dr Nicolette Goldman office to inform them of the APPROVAL for services in providers office 4/9/20 to 10/5/20. Dr Edwar May - [phone]  Art Keating - [phone]    Scanned document is attached.

## 2020-06-13 ENCOUNTER — PATIENT MESSAGE (OUTPATIENT)
Dept: FAMILY MEDICINE CLINIC | Age: 50
End: 2020-06-13

## 2020-06-14 ENCOUNTER — PATIENT MESSAGE (OUTPATIENT)
Dept: FAMILY MEDICINE CLINIC | Age: 50
End: 2020-06-14

## 2020-06-14 RX ORDER — LEVOTHYROXINE SODIUM 0.05 MG/1
50 TABLET ORAL DAILY
Qty: 90 TABLET | Refills: 2 | Status: SHIPPED | OUTPATIENT
Start: 2020-06-14 | End: 2021-03-01 | Stop reason: SDUPTHER

## 2020-06-14 NOTE — TELEPHONE ENCOUNTER
From: Kumar Osborn  To: Jaron Holcomb MD  Sent: 6/13/2020 10:19 PM EDT  Subject: Prescription Question    Can you send in a refill for my levothyroxine? I will be out on Thursday. Please call it in to the [place] [address] in [facility]. Thank you!

## 2020-06-16 NOTE — TELEPHONE ENCOUNTER
From: August Jacobs  To: Tad Galvan MD  Sent: 6/14/2020 8:59 PM EDT  Subject: Prescription Question    Thank you! You too.      ----- Message -----   From:Nichole Bridges MD   Sent:6/14/2020 7:37 PM EDT   To:Sharyn Byrne   Subject:RE: Prescription Question    I will take care of it. Stay well. Dr. Melina Lao       ----- Message -----   From:Sharyn Byrne   Sent:6/13/2020 10:19 PM EDT   To:Nichole Bridges MD   Subject:Prescription Question    Can you send in a refill for my levothyroxine? I will be out on Thursday. [facility] [address] in [facility]. Thank you!

## 2020-08-15 ENCOUNTER — PATIENT MESSAGE (OUTPATIENT)
Dept: FAMILY MEDICINE CLINIC | Age: 50
End: 2020-08-15

## 2020-08-17 RX ORDER — TRIAMTERENE AND HYDROCHLOROTHIAZIDE 37.5; 25 MG/1; MG/1
1 TABLET ORAL DAILY
Qty: 90 TABLET | Refills: 1 | Status: SHIPPED | OUTPATIENT
Start: 2020-08-17 | End: 2021-05-03

## 2020-08-17 NOTE — TELEPHONE ENCOUNTER
From: Fadia Kelly  To: Betty Rowley MD  Sent: 8/15/2020 5:06 PM EDT  Subject: Prescription Question    Could you call in a refill for my triamterene?  To the [place] in [facility])

## 2021-01-14 ENCOUNTER — PATIENT MESSAGE (OUTPATIENT)
Dept: FAMILY MEDICINE CLINIC | Age: 51
End: 2021-01-14

## 2021-01-14 DIAGNOSIS — R73.03 PREDIABETES: ICD-10-CM

## 2021-01-14 RX ORDER — METFORMIN HYDROCHLORIDE 500 MG/1
TABLET, EXTENDED RELEASE ORAL
Qty: 180 TABLET | Refills: 1 | Status: SHIPPED | OUTPATIENT
Start: 2021-01-14 | End: 2021-06-14 | Stop reason: SDUPTHER

## 2021-01-14 NOTE — TELEPHONE ENCOUNTER
See below pt message  Last ov 01/27/2020  Will inform pt to schedule appt  Any labs needed? Pt question  I've been following a whole food plant based vegan diet for 80 days now. I'm wondering if you think I should still take the metformin or if you think it may be unnecessary?    Med pending approval if needed    Please place orders or referrals for colonoscopy and mammogram

## 2021-01-24 NOTE — PROGRESS NOTES
Subjective:      Patient ID: Rima Narvaez is a 45 y.o. female is here for her annual wellness exam.     HPI    The primary encounter diagnosis was Well adult exam. Diagnoses of Breast cancer screening by mammogram, Colon cancer screening, Need for shingles vaccine, Need for influenza vaccination, Encounter for hepatitis C screening test for low risk patient, Screening for human immunodeficiency virus without presence of risk factors, Ovarian cyst, right, and Impaired fasting glucose were also pertinent to this visit. PAP + HPV:  Negative 1/27/20  Menses: LMP about 2 months ago. No night sweats or hot flashes. Ovarian cyst on US one year ago. Follow up after 2 cycles recommended; was not completed  Mammogram:  11/11/16. Normal.  FH + for breast cancer. MGM age 57. Colon Cancer: never screened. FH negative for colon cancer. Vaccines: Shingrix, COVID. Hepatitis C and HIV screening: due  Diet: eating Vegan, occ dairy. Lost 20 lb from max. Takes B 12 and D3. Exercise:  2 to 3 times a week.      Health Maintenance   Topic Date Due    Hepatitis C screen  1970    HIV screen  10/09/1984    Breast cancer screen  10/09/2019    Shingles Vaccine (1 of 2) 10/09/2019    Colon cancer screen colonoscopy  10/09/2019    A1C test (Diabetic or Prediabetic)  10/25/2020    Potassium monitoring  10/25/2020    Creatinine monitoring  10/25/2020    TSH testing  01/16/2021    Lipid screen  10/25/2024    DTaP/Tdap/Td vaccine (2 - Td) 01/16/2025    Cervical cancer screen  01/27/2025    Flu vaccine  Completed    Hepatitis A vaccine  Aged Out    Hepatitis B vaccine  Aged Out    Hib vaccine  Aged Out    Meningococcal (ACWY) vaccine  Aged Out    Pneumococcal 0-64 years Vaccine  Aged Out           Outpatient Medications Marked as Taking for the 1/25/21 encounter (Office Visit) with Sam Sherman MD   Medication Sig Dispense Refill    Cholecalciferol (VITAMIN D3) 125 MCG (5000 UT) TABS Take 1 tablet by mouth every other day      vitamin B-12 (CYANOCOBALAMIN) 1000 MCG tablet Take 1,000 mcg by mouth every other day      metFORMIN (GLUCOPHAGE-XR) 500 MG extended release tablet TAKE 2 TABLETS BY MOUTH DAILY WITH BREAKFAST 180 tablet 1    triamterene-hydroCHLOROthiazide (MAXZIDE-25) 37.5-25 MG per tablet Take 1 tablet by mouth daily 90 tablet 1    levothyroxine (SYNTHROID) 50 MCG tablet Take 1 tablet by mouth Daily 90 tablet 2    cetirizine (ZYRTEC) 10 MG tablet Take 10 mg by mouth daily      Omega-3 1000 MG CAPS Take by mouth daily      magnesium 30 MG tablet Take 30 mg by mouth 2 times daily         Allergies   Allergen Reactions    Ampicillin Hives       Patient Active Problem List   Diagnosis    Acquired hypothyroidism    Goiter    Anxiety    Class 1 obesity due to excess calories without serious comorbidity with body mass index (BMI) of 33.0 to 33.9 in adult    Patellofemoral arthralgia of both knees    Prediabetes    DUB (dysfunctional uterine bleeding)    Right ovarian cyst        Past Medical History:   Diagnosis Date    Attention deficit disorder (ADD) without hyperactivity 12/22/2017    Blood clot in abdominal vein 07/1994    superior mesentary artery with appendicitis, sepsis    Goiter        Past Surgical History:   Procedure Laterality Date    ABDOMINOPLASTY  10/2013    diastasis rectus, hernia repair    APPENDECTOMY  07/1994       Social History     Tobacco Use    Smoking status: Never Smoker    Smokeless tobacco: Never Used   Substance Use Topics    Alcohol use: Yes     Comment: 0 - 1 per day    Drug use: No       Family History   Problem Relation Age of Onset    Breast Cancer Maternal Grandmother 58    Heart Attack Maternal Grandfather 48        morbidly obese    Heart Attack Maternal Uncle 50        sudden cardiac death, healthy    COPD Paternal Grandfather        Review of Systems  Review of Systems   Constitutional: Negative for activity change, appetite change, fatigue and unexpected weight change. HENT: Positive for rhinorrhea. Negative for congestion, hearing loss, nosebleeds, sore throat, tinnitus, trouble swallowing and voice change. Allergies well controlled with over-the-counter medications. Eyes: Negative for visual disturbance. Respiratory: Negative for choking, chest tightness, shortness of breath and wheezing. Cardiovascular: Negative for chest pain, palpitations and leg swelling. Gastrointestinal: Negative for abdominal pain, anal bleeding, blood in stool, constipation, diarrhea and nausea. Endocrine: Negative for polydipsia and polyuria. Genitourinary: Negative for dysuria, flank pain, frequency, hematuria, pelvic pain, vaginal bleeding and vaginal discharge. Musculoskeletal: Positive for arthralgias. Negative for myalgias. Mild knee pain. Sees Dr. Lexie Raines. Had Synvisc. Skin: Negative for color change and rash. Would like to see dermatology. Neurological: Negative for light-headedness and headaches. Hematological: Does not bruise/bleed easily. Psychiatric/Behavioral: Negative for dysphoric mood and sleep disturbance. The patient is not nervous/anxious.           Lab Results   Component Value Date     10/25/2019    K 4.0 10/25/2019     10/25/2019    CO2 27 10/25/2019    BUN 10 10/25/2019    CREATININE 0.6 10/25/2019    GLUCOSE 88 10/25/2019    CALCIUM 9.0 10/25/2019    PROT 6.8 10/25/2019    LABALBU 3.8 10/25/2019    BILITOT 0.4 10/25/2019    ALKPHOS 57 10/25/2019    AST 15 10/25/2019    ALT 10 10/25/2019    LABGLOM >60 10/25/2019    GFRAA >60 10/25/2019    AGRATIO 1.3 10/25/2019    GLOB 3.0 10/25/2019        Lab Results   Component Value Date    CHOL 195 10/25/2019    CHOL 233 (H) 10/26/2018     Lab Results   Component Value Date    TRIG 75 10/25/2019    TRIG 84 10/26/2018     Lab Results   Component Value Date    HDL 69 (H) 10/25/2019    HDL 83 (H) 10/26/2018     Lab Results   Component Value Date of head: No submandibular adenopathy. Cervical: No cervical adenopathy. Skin:     General: Skin is warm and dry. Findings: No lesion or rash. Comments: No abnormal appearing lesions on exposed skin   Neurological:      Mental Status: She is alert and oriented to person, place, and time. Gait: Gait normal.      Deep Tendon Reflexes:      Reflex Scores:       Patellar reflexes are 2+ on the right side and 2+ on the left side. Psychiatric:         Speech: Speech normal.         Behavior: Behavior normal.         Judgment: Judgment normal.           Assessment and Plan       Diagnosis Orders   1. Well adult exam  Comprehensive Metabolic Panel    TSH with Reflex    Lipid Panel    CBC  Discussed diet, exercise   Calcium and Vitamin D addressed  PAP + HPV every 5 years  Annual to biannual mammogram  Annual influenza vaccine  Dt every 10 years  Colonoscopy every 10 years until age 75  Dexa scan every 3 to 5 years      2. Breast cancer screening by mammogram  OLEGARIO DIGITAL SCREEN W OR WO CAD BILATERAL   3. Colon cancer screening  KATHE - Heather Alba MD, Gastroenterology, [facility]  Colonoscopy risks and benefits addressed and she agrees to schedule. 4. Need for shingles vaccine  When available. 6. Encounter for hepatitis C screening test for low risk patient  Hepatitis C Antibody   7. Screening for human immunodeficiency virus without presence of risk factors  HIV Screen   8. Ovarian cyst, right  Declines follow up. US reviewed. 9. Impaired fasting glucose  Hemoglobin A1C   10.  Sun-damaged skin  [address], [address], DO, Dermatology, [facility]

## 2021-01-25 ENCOUNTER — OFFICE VISIT (OUTPATIENT)
Dept: FAMILY MEDICINE CLINIC | Age: 51
End: 2021-01-25
Payer: OTHER GOVERNMENT

## 2021-01-25 VITALS
DIASTOLIC BLOOD PRESSURE: 72 MMHG | OXYGEN SATURATION: 97 % | HEART RATE: 72 BPM | SYSTOLIC BLOOD PRESSURE: 106 MMHG | BODY MASS INDEX: 29.98 KG/M2 | TEMPERATURE: 97.1 F | WEIGHT: 191 LBS | RESPIRATION RATE: 16 BRPM | HEIGHT: 67 IN

## 2021-01-25 DIAGNOSIS — N83.201 OVARIAN CYST, RIGHT: ICD-10-CM

## 2021-01-25 DIAGNOSIS — Z11.59 ENCOUNTER FOR HEPATITIS C SCREENING TEST FOR LOW RISK PATIENT: ICD-10-CM

## 2021-01-25 DIAGNOSIS — L57.8 SUN-DAMAGED SKIN: ICD-10-CM

## 2021-01-25 DIAGNOSIS — Z23 NEED FOR SHINGLES VACCINE: ICD-10-CM

## 2021-01-25 DIAGNOSIS — Z00.00 WELL ADULT EXAM: Primary | ICD-10-CM

## 2021-01-25 DIAGNOSIS — Z11.4 SCREENING FOR HUMAN IMMUNODEFICIENCY VIRUS WITHOUT PRESENCE OF RISK FACTORS: ICD-10-CM

## 2021-01-25 DIAGNOSIS — Z12.11 COLON CANCER SCREENING: ICD-10-CM

## 2021-01-25 DIAGNOSIS — R73.01 IMPAIRED FASTING GLUCOSE: ICD-10-CM

## 2021-01-25 DIAGNOSIS — Z12.31 BREAST CANCER SCREENING BY MAMMOGRAM: ICD-10-CM

## 2021-01-25 DIAGNOSIS — Z00.00 WELL ADULT EXAM: ICD-10-CM

## 2021-01-25 LAB
A/G RATIO: 1.4 (ref 1.1–2.2)
ALBUMIN SERPL-MCNC: 4.3 G/DL (ref 3.4–5)
ALP BLD-CCNC: 79 U/L (ref 40–129)
ALT SERPL-CCNC: 9 U/L (ref 10–40)
ANION GAP SERPL CALCULATED.3IONS-SCNC: 10 MMOL/L (ref 3–16)
AST SERPL-CCNC: 16 U/L (ref 15–37)
BILIRUB SERPL-MCNC: 0.4 MG/DL (ref 0–1)
BUN BLDV-MCNC: 14 MG/DL (ref 7–20)
CALCIUM SERPL-MCNC: 9.8 MG/DL (ref 8.3–10.6)
CHLORIDE BLD-SCNC: 101 MMOL/L (ref 99–110)
CHOLESTEROL, TOTAL: 199 MG/DL (ref 0–199)
CO2: 29 MMOL/L (ref 21–32)
CREAT SERPL-MCNC: 0.9 MG/DL (ref 0.6–1.1)
GFR AFRICAN AMERICAN: >60
GFR NON-AFRICAN AMERICAN: >60
GLOBULIN: 3 G/DL
GLUCOSE BLD-MCNC: 93 MG/DL (ref 70–99)
HCT VFR BLD CALC: 40 % (ref 36–48)
HDLC SERPL-MCNC: 57 MG/DL (ref 40–60)
HEMOGLOBIN: 13.3 G/DL (ref 12–16)
HEPATITIS C ANTIBODY INTERPRETATION: NORMAL
LDL CHOLESTEROL CALCULATED: 117 MG/DL
MCH RBC QN AUTO: 29.8 PG (ref 26–34)
MCHC RBC AUTO-ENTMCNC: 33.2 G/DL (ref 31–36)
MCV RBC AUTO: 89.9 FL (ref 80–100)
PDW BLD-RTO: 13.9 % (ref 12.4–15.4)
PLATELET # BLD: 270 K/UL (ref 135–450)
PMV BLD AUTO: 9.1 FL (ref 5–10.5)
POTASSIUM SERPL-SCNC: 4.5 MMOL/L (ref 3.5–5.1)
RBC # BLD: 4.45 M/UL (ref 4–5.2)
SODIUM BLD-SCNC: 140 MMOL/L (ref 136–145)
TOTAL PROTEIN: 7.3 G/DL (ref 6.4–8.2)
TRIGL SERPL-MCNC: 123 MG/DL (ref 0–150)
TSH REFLEX: 2.37 UIU/ML (ref 0.27–4.2)
VLDLC SERPL CALC-MCNC: 25 MG/DL
WBC # BLD: 5.1 K/UL (ref 4–11)

## 2021-01-25 PROCEDURE — 99396 PREV VISIT EST AGE 40-64: CPT | Performed by: FAMILY MEDICINE

## 2021-01-25 RX ORDER — LANOLIN ALCOHOL/MO/W.PET/CERES
1000 CREAM (GRAM) TOPICAL EVERY OTHER DAY
COMMUNITY

## 2021-01-25 SDOH — ECONOMIC STABILITY: TRANSPORTATION INSECURITY
IN THE PAST 12 MONTHS, HAS LACK OF TRANSPORTATION KEPT YOU FROM MEETINGS, WORK, OR FROM GETTING THINGS NEEDED FOR DAILY LIVING?: NO

## 2021-01-25 SDOH — ECONOMIC STABILITY: TRANSPORTATION INSECURITY
IN THE PAST 12 MONTHS, HAS THE LACK OF TRANSPORTATION KEPT YOU FROM MEDICAL APPOINTMENTS OR FROM GETTING MEDICATIONS?: NO

## 2021-01-25 SDOH — ECONOMIC STABILITY: FOOD INSECURITY: WITHIN THE PAST 12 MONTHS, YOU WORRIED THAT YOUR FOOD WOULD RUN OUT BEFORE YOU GOT MONEY TO BUY MORE.: NEVER TRUE

## 2021-01-25 SDOH — ECONOMIC STABILITY: FOOD INSECURITY: WITHIN THE PAST 12 MONTHS, THE FOOD YOU BOUGHT JUST DIDN'T LAST AND YOU DIDN'T HAVE MONEY TO GET MORE.: NEVER TRUE

## 2021-01-25 ASSESSMENT — ENCOUNTER SYMPTOMS
SORE THROAT: 0
CHEST TIGHTNESS: 0
NAUSEA: 0
TROUBLE SWALLOWING: 0
BLOOD IN STOOL: 0
CHOKING: 0
WHEEZING: 0
SHORTNESS OF BREATH: 0
DIARRHEA: 0
ANAL BLEEDING: 0
VOICE CHANGE: 0
COLOR CHANGE: 0
ABDOMINAL PAIN: 0
RHINORRHEA: 1
CONSTIPATION: 0

## 2021-01-25 ASSESSMENT — PATIENT HEALTH QUESTIONNAIRE - PHQ9
SUM OF ALL RESPONSES TO PHQ9 QUESTIONS 1 & 2: 0
SUM OF ALL RESPONSES TO PHQ QUESTIONS 1-9: 0
1. LITTLE INTEREST OR PLEASURE IN DOING THINGS: 0

## 2021-01-26 LAB
ESTIMATED AVERAGE GLUCOSE: 111.2 MG/DL
HBA1C MFR BLD: 5.5 %
HIV AG/AB: NORMAL
HIV ANTIGEN: NORMAL
HIV-1 ANTIBODY: NORMAL
HIV-2 AB: NORMAL

## 2021-03-01 ENCOUNTER — PATIENT MESSAGE (OUTPATIENT)
Dept: FAMILY MEDICINE CLINIC | Age: 51
End: 2021-03-01

## 2021-03-01 RX ORDER — LEVOTHYROXINE SODIUM 0.05 MG/1
50 TABLET ORAL DAILY
Qty: 90 TABLET | Refills: 2 | Status: SHIPPED | OUTPATIENT
Start: 2021-03-01 | End: 2021-12-09

## 2021-03-01 NOTE — TELEPHONE ENCOUNTER
LOV-1/25/2021  LL-1/25/2021  No upcoming apt  rx pending     Is there another dermatologist that pt can be referred to?   Please advise   Thanks

## 2021-03-01 NOTE — TELEPHONE ENCOUNTER
From: Mario Coughlin  To: Chandu Mcguire MD  Sent: 3/1/2021 11:29 AM EST  Subject: Prescription Question    Hello,    Couple things: one Dr Jaja Gibson referred me to [facility] and they are not accepting new patients. Do you k ow who else is in [facility]'s network that she would recommend? And second, could you send a prescription of my synthyroid to [facility] in [facility]?      Thank you

## 2021-03-16 ENCOUNTER — PATIENT MESSAGE (OUTPATIENT)
Dept: FAMILY MEDICINE CLINIC | Age: 51
End: 2021-03-16

## 2021-03-16 NOTE — TELEPHONE ENCOUNTER
From: Gabriela Cabrales  To: Mayra Rivas MD  Sent: 3/16/2021  1:58 PM EDT  Subject: Non-Urgent Medical Question    Can you get the COVID vaccine while taking antibiotics? My daughter was just diagnosed with a sinus infection today and is scheduled for the COVID vaccine Saturday. Can she still take it?

## 2021-05-10 ENCOUNTER — TELEPHONE (OUTPATIENT)
Dept: FAMILY MEDICINE CLINIC | Age: 51
End: 2021-05-10

## 2021-05-13 DIAGNOSIS — R73.03 PREDIABETES: ICD-10-CM

## 2021-06-03 ENCOUNTER — TELEPHONE (OUTPATIENT)
Dept: FAMILY MEDICINE CLINIC | Age: 51
End: 2021-06-03

## 2021-06-03 NOTE — TELEPHONE ENCOUNTER
Referral to Dr Corie Eisenmenger, Dermatopathology [facility] of [facility] has been APPROVED 5/28/21 to 5/28/22. Please inform pt so she can schedule an appt. Document attached.

## 2021-06-12 ENCOUNTER — PATIENT MESSAGE (OUTPATIENT)
Dept: FAMILY MEDICINE CLINIC | Age: 51
End: 2021-06-12

## 2021-06-12 DIAGNOSIS — R73.03 PREDIABETES: ICD-10-CM

## 2021-06-14 RX ORDER — METFORMIN HYDROCHLORIDE 500 MG/1
TABLET, EXTENDED RELEASE ORAL
Qty: 180 TABLET | Refills: 1 | Status: SHIPPED | OUTPATIENT
Start: 2021-06-14 | End: 2021-08-13 | Stop reason: SDUPTHER

## 2021-06-14 NOTE — TELEPHONE ENCOUNTER
From: Manas Palacio  To: Jorge Yeung MD  Sent: 6/12/2021 10:26 PM EDT  Subject: Prescription Question    I am going to need refills for my diclofenac (anti-I flammatory I take for my knees prn) and metformin. If you could call them in to the [facility] on [address] in [facility] that would be great. Thank you!

## 2021-06-14 NOTE — TELEPHONE ENCOUNTER
Requested Prescriptions     Pending Prescriptions Disp Refills    metFORMIN (GLUCOPHAGE-XR) 500 MG extended release tablet 180 tablet 1     Sig: TAKE 2 TABLETS BY MOUTH DAILY WITH BREAKFAST       LOV 1/25/2021  No f/u  Labs 1/25/21    I do not see the diclofenac in her current or historical meds

## 2021-06-21 ENCOUNTER — TELEPHONE (OUTPATIENT)
Dept: FAMILY MEDICINE CLINIC | Age: 51
End: 2021-06-21

## 2021-06-21 DIAGNOSIS — M17.12 PRIMARY OSTEOARTHRITIS OF LEFT KNEE: Primary | ICD-10-CM

## 2021-06-21 NOTE — TELEPHONE ENCOUNTER
Dr. Pepe Ortiz - orthopaedic at [facility]    Patient had a referral which patient states  in 2021. Patient states she can get in with him today if a referral could be sent to [address] [place] [address]. Could you please send and/or call for an urgent referral to [facility] for Orthopaedic?

## 2021-06-22 NOTE — TELEPHONE ENCOUNTER
Please sign new referral in order to fax  referral.      authorization completed at my desk to be faxed.      Thank you

## 2021-06-29 ENCOUNTER — TELEPHONE (OUTPATIENT)
Dept: FAMILY MEDICINE CLINIC | Age: 51
End: 2021-06-29

## 2021-07-23 ENCOUNTER — PATIENT MESSAGE (OUTPATIENT)
Dept: FAMILY MEDICINE CLINIC | Age: 51
End: 2021-07-23

## 2021-07-23 DIAGNOSIS — R73.03 PREDIABETES: ICD-10-CM

## 2021-07-23 DIAGNOSIS — R73.01 ELEVATED FASTING GLUCOSE: Primary | ICD-10-CM

## 2021-07-23 NOTE — TELEPHONE ENCOUNTER
Please advise    Is it possible to increase my metformin dose? My blood sugar has been running high lately. Let me know if you think that's possible.  Thanks

## 2021-08-13 RX ORDER — METFORMIN HYDROCHLORIDE 500 MG/1
TABLET, EXTENDED RELEASE ORAL
Qty: 180 TABLET | Refills: 1 | Status: SHIPPED | OUTPATIENT
Start: 2021-08-13 | End: 2022-02-07

## 2021-08-17 NOTE — TELEPHONE ENCOUNTER
Please advise    \"I saw Dr. Joya Oh yesterday for my synvisc injection for my left knee for the osteoarthritis. He mentioned it my increase my blood sugar temporarily so advised me not to get tested for a couple days. We also asked about the metformin. He was wondering how you felt about using it to help me lose this weight that I can't seem to get rid of, thank you menopause, but that is greatly impacting my arthritis. Is that something you think could help? I've moved to a strictly plant based diet, I'm working out on [employer] 3 days a week and greatly reduced my sugar intake but things aren't changing. I'm willing to keep it up but am getting disheartened as my knee pain is worsening making everything more difficult. \"

## 2022-01-28 ENCOUNTER — PATIENT MESSAGE (OUTPATIENT)
Dept: FAMILY MEDICINE CLINIC | Age: 52
End: 2022-01-28

## 2022-01-28 DIAGNOSIS — Z00.00 WELL ADULT EXAM: Primary | ICD-10-CM

## 2022-01-28 RX ORDER — TRIAMTERENE AND HYDROCHLOROTHIAZIDE 37.5; 25 MG/1; MG/1
1 TABLET ORAL DAILY
Qty: 90 TABLET | Refills: 0 | Status: SHIPPED | OUTPATIENT
Start: 2022-01-28 | End: 2022-02-21 | Stop reason: SDUPTHER

## 2022-01-28 NOTE — TELEPHONE ENCOUNTER
From: John Begum  To: Dr. Rbeeca Valderrama: 1/28/2022 7:18 AM EST  Subject: Refill     Good morning,    Could you guys send a refill prescription for my triamterene to the [facility] in [facility] on [address]?     Thank you

## 2022-01-28 NOTE — TELEPHONE ENCOUNTER
Requested Prescriptions     Pending Prescriptions Disp Refills    triamterene-hydroCHLOROthiazide (MAXZIDE-25) 37.5-25 MG per tablet 90 tablet 0     Sig: Take 1 tablet by mouth daily       LOV 1/25/2021  No f/u  Labs 1/25/21

## 2022-02-06 ENCOUNTER — TELEPHONE (OUTPATIENT)
Dept: FAMILY MEDICINE CLINIC | Age: 52
End: 2022-02-06

## 2022-02-06 DIAGNOSIS — R73.03 PREDIABETES: ICD-10-CM

## 2022-02-07 ENCOUNTER — PATIENT MESSAGE (OUTPATIENT)
Dept: FAMILY MEDICINE CLINIC | Age: 52
End: 2022-02-07

## 2022-02-07 RX ORDER — METFORMIN HYDROCHLORIDE 500 MG/1
TABLET, EXTENDED RELEASE ORAL
Qty: 60 TABLET | Refills: 0 | Status: SHIPPED | OUTPATIENT
Start: 2022-02-07 | End: 2022-02-21 | Stop reason: SDUPTHER

## 2022-02-07 NOTE — TELEPHONE ENCOUNTER
Requested Prescriptions     Pending Prescriptions Disp Refills    metFORMIN (GLUCOPHAGE-XR) 500 MG extended release tablet [Pharmacy Med Name: METFORMIN ER 500MG 24HR TABS] 180 tablet 1     Sig: TAKE 2 TABLETS BY MOUTH DAILY WITH BREAKFAST       LOV 1/25/21  Labs 1/25/21  No f/u

## 2022-02-07 NOTE — TELEPHONE ENCOUNTER
Please call for appt and labs. There is a message asking for this on 1/28 that has not been completed yet.   Thanks

## 2022-02-20 NOTE — PROGRESS NOTES
Subjective:      Patient ID: Mercedes Plasencia is a 45 y.o. female is here for her annual wellness exam.     HPI    PAP + HPV negative 1/27/20. PAPs have all been normal.  LMP mid September. Not hot flashes or night sweats. Mammogram: normal 3/8/20. MGM had breast cancer at 61  Colonoscopy:  Normal 5/21/21. Recommend 10 year f/u if no FH. There was a question at the time of the colonoscopy about her GM having polyps. She confirms that her PGM did have colon cancer at age 79. Vaccines: due Shingrix   Diet: is on a weight loss program.   Exercise:  Walking some     Impaired glucose tolerance: treated with diet, exercise, metformin. HGA1C last year normal at 5.5     Hypothyroid: on levothyroxine. Gets annual TSH.      Health Maintenance   Topic Date Due    Depression Screen  Never done    Shingles Vaccine (1 of 2) Never done    COVID-19 Vaccine (2 - Moderna 3-dose series) 02/05/2021    Flu vaccine (1) 09/01/2021    TSH testing  01/25/2022    Potassium monitoring  01/25/2022    Creatinine monitoring  01/25/2022    A1C test (Diabetic or Prediabetic)  01/25/2022    Breast cancer screen  03/08/2023    DTaP/Tdap/Td vaccine (2 - Td or Tdap) 01/16/2025    Cervical cancer screen  01/27/2025    Lipid screen  01/25/2026    Colorectal Cancer Screen  05/21/2031    Hepatitis C screen  Completed    HIV screen  Completed    Hepatitis A vaccine  Aged Out    Hepatitis B vaccine  Aged Out    Hib vaccine  Aged Out    Meningococcal (ACWY) vaccine  Aged Out    Pneumococcal 0-64 years Vaccine  Aged Out           Outpatient Medications Marked as Taking for the 2/21/22 encounter (Office Visit) with Jeremy Mayo MD   Medication Sig Dispense Refill    metFORMIN (GLUCOPHAGE-XR) 500 MG extended release tablet TAKE 2 TABLETS BY MOUTH DAILY WITH BREAKFAST 60 tablet 0    triamterene-hydroCHLOROthiazide (MAXZIDE-25) 37.5-25 MG per tablet Take 1 tablet by mouth daily 90 tablet 0    levothyroxine (SYNTHROID) 50 MCG tablet TAKE 1 TABLET BY MOUTH DAILY 90 tablet 0    Cholecalciferol (VITAMIN D3) 125 MCG (5000 UT) TABS Take 1 tablet by mouth every other day      vitamin B-12 (CYANOCOBALAMIN) 1000 MCG tablet Take 1,000 mcg by mouth every other day      cetirizine (ZYRTEC) 10 MG tablet Take 10 mg by mouth daily      magnesium 30 MG tablet Take 30 mg by mouth 2 times daily         Allergies   Allergen Reactions    Ampicillin Hives       Patient Active Problem List   Diagnosis    Acquired hypothyroidism    Goiter    Anxiety    Class 1 obesity due to excess calories without serious comorbidity with body mass index (BMI) of 33.0 to 33.9 in adult    Patellofemoral arthralgia of both knees    Prediabetes    DUB (dysfunctional uterine bleeding)    Right ovarian cyst        Past Medical History:   Diagnosis Date    Attention deficit disorder (ADD) without hyperactivity 12/22/2017    Blood clot in abdominal vein 07/1994    superior mesentary artery with appendicitis, sepsis    Goiter        Past Surgical History:   Procedure Laterality Date    ABDOMINOPLASTY  10/2013    diastasis rectus, hernia repair    APPENDECTOMY  07/1994       Social History     Tobacco Use    Smoking status: Never Smoker    Smokeless tobacco: Never Used   Substance Use Topics    Alcohol use: Yes     Comment: 0 - 1 per day    Drug use: No       Family History   Problem Relation Age of Onset    Breast Cancer Maternal Grandmother 58    Heart Attack Maternal Grandfather 50        morbidly obese    Heart Attack Maternal Uncle 50        sudden cardiac death, healthy    COPD Paternal Grandfather        Review of Systems  Review of Systems   Constitutional: Negative for activity change, appetite change, fatigue and unexpected weight change. HENT: Negative for congestion, hearing loss, nosebleeds, sore throat, tinnitus, trouble swallowing and voice change. Eyes: Negative for visual disturbance.    Respiratory: Negative for cough, chest tightness, shortness of breath and wheezing. Cardiovascular: Negative for chest pain, palpitations and leg swelling. Gastrointestinal: Positive for constipation. Negative for abdominal pain, anal bleeding, blood in stool, diarrhea and nausea. Mild constipation since on diet. Genitourinary: Negative for dysuria, flank pain, frequency, hematuria, pelvic pain, urgency, vaginal bleeding and vaginal discharge. Musculoskeletal: Positive for neck pain. Negative for arthralgias, back pain and myalgias. Neck is sore in the upper neck most of the time. No radicular pain, weakness/numbness in the arms. Skin: Negative for rash. Allergic/Immunologic: Negative for environmental allergies. Neurological: Negative for light-headedness and headaches. Hematological: Does not bruise/bleed easily. Psychiatric/Behavioral: Negative for dysphoric mood and sleep disturbance. The patient is not nervous/anxious.       Lab Results   Component Value Date     01/25/2021    K 4.5 01/25/2021     01/25/2021    CO2 29 01/25/2021    BUN 14 01/25/2021    CREATININE 0.9 01/25/2021    GLUCOSE 93 01/25/2021    CALCIUM 9.8 01/25/2021    PROT 7.3 01/25/2021    LABALBU 4.3 01/25/2021    BILITOT 0.4 01/25/2021    ALKPHOS 79 01/25/2021    AST 16 01/25/2021    ALT 9 (L) 01/25/2021    LABGLOM >60 01/25/2021    GFRAA >60 01/25/2021    AGRATIO 1.4 01/25/2021    GLOB 3.0 01/25/2021        Lab Results   Component Value Date    WBC 5.1 01/25/2021    HGB 13.3 01/25/2021    HCT 40.0 01/25/2021    MCV 89.9 01/25/2021     01/25/2021     TSH   Date Value Ref Range Status   01/25/2021 2.37 0.27 - 4.20 uIU/mL Final   01/16/2020 1.55 0.27 - 4.20 uIU/mL Final   10/25/2019 1.56 0.27 - 4.20 uIU/mL Final     Lab Results   Component Value Date    LABA1C 5.5 01/25/2021     Lab Results   Component Value Date    .2 01/25/2021     Lab Results   Component Value Date    CHOL 199 01/25/2021    CHOL 195 10/25/2019    CHOL 233 (H) 10/26/2018     Lab Results   Component Value Date    TRIG 123 01/25/2021    TRIG 75 10/25/2019    TRIG 84 10/26/2018     Lab Results   Component Value Date    HDL 57 01/25/2021    HDL 69 (H) 10/25/2019    HDL 83 (H) 10/26/2018     Lab Results   Component Value Date    LDLCALC 117 (H) 01/25/2021    LDLCALC 111 (H) 10/25/2019    LDLCALC 133 (H) 10/26/2018     Lab Results   Component Value Date    LABVLDL 25 01/25/2021    LABVLDL 15 10/25/2019    LABVLDL 17 10/26/2018     No results found for: CHOLHDLRATIO   Objective:   Physical Exam  .  Vitals:    02/21/22 0847   BP: 100/70   Pulse: 70   Resp: 12   Temp: 97 °F (36.1 °C)   SpO2: 98%     Wt Readings from Last 3 Encounters:   02/21/22 173 lb (78.5 kg)   01/25/21 191 lb (86.6 kg)   01/27/20 202 lb 8 oz (91.9 kg)        Physical Exam  Constitutional:       Appearance: Normal appearance. She is well-developed. HENT:      Head: Normocephalic and atraumatic. Right Ear: Hearing, tympanic membrane, ear canal and external ear normal.      Left Ear: Hearing, tympanic membrane, ear canal and external ear normal.      Nose: Nose normal.      Mouth/Throat:      Pharynx: Uvula midline. Eyes:      General: Lids are normal.      Conjunctiva/sclera: Conjunctivae normal.      Pupils: Pupils are equal, round, and reactive to light. Funduscopic exam:     Right eye: No hemorrhage, AV nicking or arteriolar narrowing. Left eye: No hemorrhage, AV nicking or arteriolar narrowing. Neck:      Thyroid: No thyroid mass or thyromegaly. Vascular: No carotid bruit or JVD. Trachea: Trachea normal.   Cardiovascular:      Rate and Rhythm: Normal rate and regular rhythm. Pulses:           Carotid pulses are 2+ on the right side and 2+ on the left side. Femoral pulses are 2+ on the right side and 2+ on the left side. Dorsalis pedis pulses are 2+ on the right side and 2+ on the left side.         Posterior tibial pulses are 2+ on the right side and 2+ on the left side. Heart sounds: Normal heart sounds. No murmur heard. Pulmonary:      Effort: Pulmonary effort is normal. No respiratory distress. Breath sounds: Normal breath sounds. Chest:   Breasts: Breasts are symmetrical.      Right: No inverted nipple, mass, nipple discharge, skin change, tenderness or supraclavicular adenopathy. Left: No inverted nipple, mass, nipple discharge, skin change, tenderness or supraclavicular adenopathy. Abdominal:      General: Bowel sounds are normal.      Tenderness: There is no abdominal tenderness. Hernia: No hernia is present. Musculoskeletal:         General: Normal range of motion. Cervical back: Neck supple. No swelling, deformity, erythema, signs of trauma, spasms, tenderness or bony tenderness. No pain with movement. Normal range of motion. Lymphadenopathy:      Head:      Right side of head: No submental or submandibular adenopathy. Left side of head: No submental or submandibular adenopathy. Cervical: No cervical adenopathy. Upper Body:      Right upper body: No supraclavicular adenopathy. Left upper body: No supraclavicular adenopathy. Skin:     General: Skin is warm and dry. Findings: No bruising, lesion or rash. Neurological:      Mental Status: She is alert. Deep Tendon Reflexes: Reflexes are normal and symmetric. Reflex Scores:       Patellar reflexes are 2+ on the right side and 2+ on the left side. Psychiatric:         Speech: Speech normal.         Behavior: Behavior normal.         Thought Content: Thought content normal.         Judgment: Judgment normal.           Assessment and Plan       Diagnosis Orders   1. Well adult exam  Discussed diet, exercise   Calcium and Vitamin D addressed  PAP every 5 years. Annual to biannual mammogram  Annual influenza vaccine  Dt every 10 years  Colonoscopy every 5 years  Dexa scan every 3 to 5 years      2.  Prediabetes  metFORMIN (GLUCOPHAGE-XR) 500 MG extended release tablet   3. Acquired hypothyroidism     4. Need for shingles vaccine     5. Strain of neck muscle, initial encounter  External Referral To Massage Therapy  Exercises and informational handout reviewed and copy provided. 6. Ovarian cyst, right  US PELVIS COMPLETE          Side effects of current medications reviewed and questions answered. Follow up in 1 year or prn.

## 2022-02-21 ENCOUNTER — OFFICE VISIT (OUTPATIENT)
Dept: FAMILY MEDICINE CLINIC | Age: 52
End: 2022-02-21
Payer: OTHER GOVERNMENT

## 2022-02-21 VITALS
DIASTOLIC BLOOD PRESSURE: 70 MMHG | RESPIRATION RATE: 12 BRPM | TEMPERATURE: 97 F | WEIGHT: 173 LBS | OXYGEN SATURATION: 98 % | HEIGHT: 68 IN | HEART RATE: 70 BPM | SYSTOLIC BLOOD PRESSURE: 100 MMHG | BODY MASS INDEX: 26.22 KG/M2

## 2022-02-21 DIAGNOSIS — E03.9 ACQUIRED HYPOTHYROIDISM: ICD-10-CM

## 2022-02-21 DIAGNOSIS — S16.1XXA STRAIN OF NECK MUSCLE, INITIAL ENCOUNTER: ICD-10-CM

## 2022-02-21 DIAGNOSIS — Z00.00 WELL ADULT EXAM: Primary | ICD-10-CM

## 2022-02-21 DIAGNOSIS — R73.03 PREDIABETES: ICD-10-CM

## 2022-02-21 DIAGNOSIS — Z00.00 WELL ADULT EXAM: ICD-10-CM

## 2022-02-21 DIAGNOSIS — N83.201 OVARIAN CYST, RIGHT: ICD-10-CM

## 2022-02-21 DIAGNOSIS — Z23 NEED FOR SHINGLES VACCINE: ICD-10-CM

## 2022-02-21 LAB
A/G RATIO: 1.7 (ref 1.1–2.2)
ALBUMIN SERPL-MCNC: 4.3 G/DL (ref 3.4–5)
ALP BLD-CCNC: 62 U/L (ref 40–129)
ALT SERPL-CCNC: 8 U/L (ref 10–40)
ANION GAP SERPL CALCULATED.3IONS-SCNC: 17 MMOL/L (ref 3–16)
AST SERPL-CCNC: 12 U/L (ref 15–37)
BILIRUB SERPL-MCNC: 0.4 MG/DL (ref 0–1)
BUN BLDV-MCNC: 17 MG/DL (ref 7–20)
CALCIUM SERPL-MCNC: 9.3 MG/DL (ref 8.3–10.6)
CHLORIDE BLD-SCNC: 102 MMOL/L (ref 99–110)
CHOLESTEROL, TOTAL: 202 MG/DL (ref 0–199)
CO2: 24 MMOL/L (ref 21–32)
CREAT SERPL-MCNC: 0.9 MG/DL (ref 0.6–1.1)
GFR AFRICAN AMERICAN: >60
GFR NON-AFRICAN AMERICAN: >60
GLUCOSE BLD-MCNC: 94 MG/DL (ref 70–99)
HCT VFR BLD CALC: 38.9 % (ref 36–48)
HDLC SERPL-MCNC: 73 MG/DL (ref 40–60)
HEMOGLOBIN: 13.1 G/DL (ref 12–16)
LDL CHOLESTEROL CALCULATED: 118 MG/DL
MCH RBC QN AUTO: 29.6 PG (ref 26–34)
MCHC RBC AUTO-ENTMCNC: 33.5 G/DL (ref 31–36)
MCV RBC AUTO: 88.4 FL (ref 80–100)
PDW BLD-RTO: 14.3 % (ref 12.4–15.4)
PLATELET # BLD: 301 K/UL (ref 135–450)
PMV BLD AUTO: 8.2 FL (ref 5–10.5)
POTASSIUM SERPL-SCNC: 4.3 MMOL/L (ref 3.5–5.1)
RBC # BLD: 4.4 M/UL (ref 4–5.2)
SODIUM BLD-SCNC: 143 MMOL/L (ref 136–145)
TOTAL PROTEIN: 6.8 G/DL (ref 6.4–8.2)
TRIGL SERPL-MCNC: 54 MG/DL (ref 0–150)
TSH REFLEX: 2.14 UIU/ML (ref 0.27–4.2)
VLDLC SERPL CALC-MCNC: 11 MG/DL
WBC # BLD: 5 K/UL (ref 4–11)

## 2022-02-21 PROCEDURE — 90750 HZV VACC RECOMBINANT IM: CPT | Performed by: FAMILY MEDICINE

## 2022-02-21 PROCEDURE — 99396 PREV VISIT EST AGE 40-64: CPT | Performed by: FAMILY MEDICINE

## 2022-02-21 PROCEDURE — 90471 IMMUNIZATION ADMIN: CPT | Performed by: FAMILY MEDICINE

## 2022-02-21 RX ORDER — LEVOTHYROXINE SODIUM 0.05 MG/1
50 TABLET ORAL DAILY
Qty: 90 TABLET | Refills: 0 | Status: CANCELLED | OUTPATIENT
Start: 2022-02-21

## 2022-02-21 RX ORDER — METFORMIN HYDROCHLORIDE 500 MG/1
1000 TABLET, EXTENDED RELEASE ORAL
Qty: 180 TABLET | Refills: 3 | Status: SHIPPED | OUTPATIENT
Start: 2022-02-21

## 2022-02-21 RX ORDER — TRIAMTERENE AND HYDROCHLOROTHIAZIDE 37.5; 25 MG/1; MG/1
1 TABLET ORAL DAILY
Qty: 90 TABLET | Refills: 3 | Status: SHIPPED | OUTPATIENT
Start: 2022-02-21 | End: 2022-05-04

## 2022-02-21 SDOH — ECONOMIC STABILITY: FOOD INSECURITY: WITHIN THE PAST 12 MONTHS, YOU WORRIED THAT YOUR FOOD WOULD RUN OUT BEFORE YOU GOT MONEY TO BUY MORE.: NEVER TRUE

## 2022-02-21 SDOH — ECONOMIC STABILITY: FOOD INSECURITY: WITHIN THE PAST 12 MONTHS, THE FOOD YOU BOUGHT JUST DIDN'T LAST AND YOU DIDN'T HAVE MONEY TO GET MORE.: NEVER TRUE

## 2022-02-21 ASSESSMENT — SOCIAL DETERMINANTS OF HEALTH (SDOH): HOW HARD IS IT FOR YOU TO PAY FOR THE VERY BASICS LIKE FOOD, HOUSING, MEDICAL CARE, AND HEATING?: NOT HARD AT ALL

## 2022-02-21 ASSESSMENT — ENCOUNTER SYMPTOMS
SORE THROAT: 0
CHEST TIGHTNESS: 0
ABDOMINAL PAIN: 0
BLOOD IN STOOL: 0
ANAL BLEEDING: 0
DIARRHEA: 0
VOICE CHANGE: 0
TROUBLE SWALLOWING: 0
CONSTIPATION: 1
BACK PAIN: 0
COUGH: 0
NAUSEA: 0
SHORTNESS OF BREATH: 0
WHEEZING: 0

## 2022-02-21 ASSESSMENT — PATIENT HEALTH QUESTIONNAIRE - PHQ9
SUM OF ALL RESPONSES TO PHQ QUESTIONS 1-9: 0
2. FEELING DOWN, DEPRESSED OR HOPELESS: 0
1. LITTLE INTEREST OR PLEASURE IN DOING THINGS: 0
SUM OF ALL RESPONSES TO PHQ QUESTIONS 1-9: 0
SUM OF ALL RESPONSES TO PHQ9 QUESTIONS 1 & 2: 0
SUM OF ALL RESPONSES TO PHQ QUESTIONS 1-9: 0
SUM OF ALL RESPONSES TO PHQ QUESTIONS 1-9: 0

## 2022-02-21 NOTE — PATIENT INSTRUCTIONS
Patient Education      Patient Education        Neck: Exercises  Introduction  Here are some examples of exercises for you to try. The exercises may be suggested for a condition or for rehabilitation. Start each exercise slowly. Ease off the exercises if you start to have pain. You will be told when to start these exercises and which ones will work best for you. How to do the exercises  Neck stretch    1. This stretch works best if you keep your shoulder down as you lean away from it. To help you remember to do this, start by relaxing your shoulders and lightly holding on to your thighs or your chair. 2. Tilt your head toward your shoulder and hold for 15 to 30 seconds. Let the weight of your head stretch your muscles. 3. If you would like a little added stretch, use your hand to gently and steadily pull your head toward your shoulder. For example, keeping your right shoulder down, lean your head to the left. 4. Repeat 2 to 4 times toward each shoulder. Diagonal neck stretch    1. Turn your head slightly toward the direction you will be stretching, and tilt your head diagonally toward your chest and hold for 15 to 30 seconds. 2. If you would like a little added stretch, use your hand to gently and steadily pull your head forward on the diagonal.  3. Repeat 2 to 4 times toward each side. Dorsal glide stretch    The dorsal glide stretches the back of the neck. If you feel pain, do not glide so far back. Some people find this exercise easier to do while lying on their backs with an ice pack on the neck. 1. Sit or stand tall and look straight ahead. 2. Slowly tuck your chin as you glide your head backward over your body  3. Hold for a count of 6, and then relax for up to 10 seconds. 4. Repeat 8 to 12 times. Chest and shoulder stretch    1. Sit or stand tall and glide your head backward as in the dorsal glide stretch. 2. Raise both arms so that your hands are next to your ears.   3. Take a deep breath, and as you breathe out, lower your elbows down and behind your back. You will feel your shoulder blades slide down and together, and at the same time you will feel a stretch across your chest and the front of your shoulders. 4. Hold for about 6 seconds, and then relax for up to 10 seconds. 5. Repeat 8 to 12 times. Strengthening: Hands on head    1. Move your head backward, forward, and side to side against gentle pressure from your hands, holding each position for about 6 seconds. 2. Repeat 8 to 12 times. Follow-up care is a key part of your treatment and safety. Be sure to make and go to all appointments, and call your doctor if you are having problems. It's also a good idea to know your test results and keep a list of the medicines you take. Where can you learn more? Go to https://[email or web address].[email or web address] org and sign in to your [employer] account. Enter P975 in the Myze box to learn more about \"Neck: Exercises. \"     If you do not have an account, please click on the \"Sign Up Now\" link. Current as of: July 1, 2021               Content Version: 13.1  © 8521-1325 Healthwise, Incorporated. Care instructions adapted under license by [facility] ([facility]). If you have questions about a medical condition or this instruction, always ask your healthcare professional. [address] any warranty or liability for your use of this information. Healthy Upper Back: Exercises  Introduction  Here are some examples of exercises for your upper back. Start each exercise slowly. Ease off the exercise if you start to have pain. Your doctor or physical therapist will tell you when you can start these exercises and which ones will work best for you. How to do the exercises  Lower neck and upper back stretch    1. Stretch your arms out in front of your body. Clasp one hand on top of your other hand.   2. Gently reach out so that you feel your shoulder blades stretching away from each other.  3. Gently bend your head forward. 4. Hold for 15 to 30 seconds. 5. Repeat 2 to 4 times. Midback stretch    If you have knee pain, do not do this exercise. 1. Kneel on the floor, and sit back on your ankles. 2. Lean forward, place your hands on the floor, and stretch your arms out in front of you. Rest your head between your arms. 3. Gently push your chest toward the floor, reaching as far in front of you as possible. 4. Hold for 15 to 30 seconds. 5. Repeat 2 to 4 times. Shoulder rolls    1. Sit comfortably with your feet shoulder-width apart. You can also do this exercise while standing. 2. Roll your shoulders up, then back, and then down in a smooth, circular motion. 3. Repeat 2 to 4 times. Wall push-up    1. Stand against a wall with your feet about 12 to 24 inches back from the wall. If you feel any pain when you do this exercise, stand closer to the wall. 2. Place your hands on the wall slightly wider apart than your shoulders, and lean forward. 3. Gently lean your body toward the wall. Then push back to your starting position. Keep the motion smooth and controlled. 4. Repeat 8 to 12 times. Resisted shoulder blade squeeze    For this exercise, you will need elastic exercise material, such as surgical tubing or Thera-Band. 1. Sit or stand, holding the band in both hands in front of you. Keep your elbows close to your sides, bent at a 90-degree angle. Your palms should face up. 2. Squeeze your shoulder blades together, and move your arms to the outside, stretching the band. Be sure to keep your elbows at your sides while you do this. 3. Relax. 4. Repeat 8 to 12 times. Resisted rows    For this exercise, you will need elastic exercise material, such as surgical tubing or Thera-Band. 1. Put the band around a solid object, such as a bedpost, at about waist level. Hold one end of the band in each hand.   2. With your elbows at your sides and bent to 90 degrees, pull the band back to move your shoulder blades toward each other. Return to the starting position. 3. Repeat 8 to 12 times. Follow-up care is a key part of your treatment and safety. Be sure to make and go to all appointments, and call your doctor if you are having problems. It's also a good idea to know your test results and keep a list of the medicines you take. Where can you learn more? Go to https://Grand RoundspeKrux.[employer]ly. org and sign in to your [employer] account. Enter B418 in the Cy[employer]ics box to learn more about \"Healthy Upper Back: Exercises. \"     If you do not have an account, please click on the \"Sign Up Now\" link. Current as of: July 1, 2021               Content Version: 13.1  © 6402-8869 Healthwise, Incorporated. Care instructions adapted under license by [facility] ([facility]). If you have questions about a medical condition or this instruction, always ask your healthcare professional. [place] [address] any warranty or liability for your use of this information.

## 2022-02-22 ENCOUNTER — PATIENT MESSAGE (OUTPATIENT)
Dept: FAMILY MEDICINE CLINIC | Age: 52
End: 2022-02-22

## 2022-02-22 LAB
ESTIMATED AVERAGE GLUCOSE: 108.3 MG/DL
HBA1C MFR BLD: 5.4 %

## 2022-02-28 RX ORDER — LEVOTHYROXINE SODIUM 0.05 MG/1
50 TABLET ORAL DAILY
Qty: 90 TABLET | Refills: 3 | Status: SHIPPED | OUTPATIENT
Start: 2022-02-28

## 2022-03-04 ENCOUNTER — HOSPITAL ENCOUNTER (OUTPATIENT)
Dept: ULTRASOUND IMAGING | Age: 52
Discharge: HOME | End: 2022-03-04
Payer: OTHER GOVERNMENT

## 2022-03-04 DIAGNOSIS — N83.201 OVARIAN CYST, RIGHT: ICD-10-CM

## 2022-03-04 PROCEDURE — 76856 US EXAM PELVIC COMPLETE: CPT

## 2022-03-04 PROCEDURE — 76830 TRANSVAGINAL US NON-OB: CPT

## 2022-05-04 RX ORDER — TRIAMTERENE AND HYDROCHLOROTHIAZIDE 37.5; 25 MG/1; MG/1
1 TABLET ORAL DAILY
Qty: 90 TABLET | Refills: 3 | Status: SHIPPED | OUTPATIENT
Start: 2022-05-04

## 2022-05-04 NOTE — TELEPHONE ENCOUNTER
Requested Prescriptions     Pending Prescriptions Disp Refills    triamterene-hydroCHLOROthiazide (MAXZIDE-25) 37.5-25 MG per tablet [Pharmacy Med Name: TRIAMTERENE 37.5MG/ HCTZ 25MG TABS] 90 tablet 3     Sig: TAKE 1 TABLET BY MOUTH DAILY       LOV 2/21/2022  No f/u  Labs 2/21/22

## 2022-05-06 ENCOUNTER — NURSE ONLY (OUTPATIENT)
Dept: FAMILY MEDICINE CLINIC | Age: 52
End: 2022-05-06
Payer: OTHER GOVERNMENT

## 2022-05-06 DIAGNOSIS — Z23 NEED FOR SHINGLES VACCINE: Primary | ICD-10-CM

## 2022-05-06 PROCEDURE — 90471 IMMUNIZATION ADMIN: CPT | Performed by: FAMILY MEDICINE

## 2022-05-06 PROCEDURE — 90750 HZV VACC RECOMBINANT IM: CPT | Performed by: FAMILY MEDICINE

## 2022-07-19 ENCOUNTER — TELEPHONE (OUTPATIENT)
Dept: FAMILY MEDICINE CLINIC | Age: 52
End: 2022-07-19

## 2022-07-19 DIAGNOSIS — M22.2X1 PATELLOFEMORAL ARTHRALGIA OF BOTH KNEES: Primary | ICD-10-CM

## 2022-07-19 DIAGNOSIS — R73.03 PREDIABETES: ICD-10-CM

## 2022-07-19 DIAGNOSIS — M22.2X2 PATELLOFEMORAL ARTHRALGIA OF BOTH KNEES: Primary | ICD-10-CM

## 2022-07-19 RX ORDER — METFORMIN HYDROCHLORIDE 500 MG/1
1000 TABLET, EXTENDED RELEASE ORAL
Qty: 180 TABLET | Refills: 3 | OUTPATIENT
Start: 2022-07-19

## 2022-07-19 NOTE — TELEPHONE ENCOUNTER
Requested Prescriptions     Pending Prescriptions Disp Refills    metFORMIN (GLUCOPHAGE-XR) 500 MG extended release tablet 180 tablet 3     Sig: Take 2 tablets by mouth daily (with breakfast)     Rx sent in on 2/21/22 3 refills 180 tabs

## 2022-07-19 NOTE — TELEPHONE ENCOUNTER
Patient is needing another referral for her knee. Her current doctor, Dr. Ricky Lee, is leaving [address] [place] [address]. Can a new one be faxed to Dr. Reina Varma with [facility]? Fax [phone]. Please call patient to advise.      Thank you

## 2022-08-16 NOTE — TELEPHONE ENCOUNTER
PT stated she need the referral resent to [facility] as stated in the first message. Fax [phone]    Please call PT to advise. Thank you!   Brittnee Letters

## 2022-11-17 ENCOUNTER — PATIENT MESSAGE (OUTPATIENT)
Dept: FAMILY MEDICINE CLINIC | Age: 52
End: 2022-11-17

## 2022-11-17 DIAGNOSIS — J02.0 ACUTE STREPTOCOCCAL PHARYNGITIS: Primary | ICD-10-CM

## 2022-11-17 RX ORDER — AZITHROMYCIN 250 MG/1
TABLET, FILM COATED ORAL
Qty: 1 PACKET | Refills: 0 | Status: SHIPPED | OUTPATIENT
Start: 2022-11-17 | End: 2022-11-27

## 2022-11-17 NOTE — TELEPHONE ENCOUNTER
From: Shantell Vásquez  To: Dr. Ashish Lo: 2022 1:02 PM EST  Subject: Antibiotics    My sister (who is staying with us in our house) just found out she has strep. Im feeling like my throat is sore. Confounding variable is that my brothers wife just  this morning so we have a lot of things we have to do ( arrangements, etc) but I dont want to inadvertently spread strep. Do you think I should come in? Could you just send in a prescription of antibiotics? What should we do?

## 2022-12-23 DIAGNOSIS — R73.03 PREDIABETES: ICD-10-CM

## 2022-12-23 RX ORDER — LEVOTHYROXINE SODIUM 0.05 MG/1
50 TABLET ORAL DAILY
Qty: 90 TABLET | Refills: 3 | OUTPATIENT
Start: 2022-12-23

## 2022-12-23 RX ORDER — METFORMIN HYDROCHLORIDE 500 MG/1
1000 TABLET, EXTENDED RELEASE ORAL
Qty: 180 TABLET | Refills: 3 | OUTPATIENT
Start: 2022-12-23

## 2022-12-23 RX ORDER — TRIAMTERENE AND HYDROCHLOROTHIAZIDE 37.5; 25 MG/1; MG/1
1 TABLET ORAL DAILY
Qty: 90 TABLET | Refills: 3 | OUTPATIENT
Start: 2022-12-23

## 2022-12-23 NOTE — TELEPHONE ENCOUNTER
Patient:   GUZMAN JEFFREY            MRN: [id number]            FIN: [id number]              Age:   76 years     Sex:  FEMALE     :  41   Associated Diagnoses:   None   Author:   ROSENDO JUAREZ     PMILIANA Martinez  CC s/p L TKA  HPI  75 yo F with HTN, HLD, knee OA admitted post op after L TKA  Doing well post op. Pain controlled. Did have an episode of vomiting after surgery, feeling better now though, denies any nausea currently.        Review of Systems   Constitutional:  Negative.    Eye:  Negative.    Ear/Nose/Mouth/Throat:  Negative.    Cardiovascular:  Negative.    Respiratory:  Negative.    Gastrointestinal:  Negative.    Genitourinary:  Negative.    Musculoskeletal:  Negative except as documented in history of present illness.   Integumentary:  Negative.    Hematology/Lymphatics:  Negative.    Neurologic:  Negative.      Histories   Past Med History:    Medical  Chronic pain / [id number] / Confirmed  H/O: blood transfusion / [id number] / Confirmed  Osteoarthritis / [id number] / Confirmed  Risk factors for obstructive sleep apnea / [id number] / Confirmed  Visual impairment / [id number] / Confirmed  Resolved: Diabetic on diet only / [id number], Past Medical History  Skin cancer  Arthritis  Chronic pain  H/O: blood transfusion  HTN (hypertension)  Hypercholesteremia  Macular degeneration  Osteoarthritis  Prediabetes  Risk factors for obstructive sleep apnea  Visual impairment    Family History:    No family history items have been selected or recorded.   Procedure History:    right knee replacement in the month of 2018 at 75 Years.  Colonoscopy ([id number]) in  at 70 Years.  Biopsy of skin ([id number]) in  at 69 Years.  Comments:  2014 09:31 - Yovani, Kiah  back  Hip replacement ([id number]) in  at 65 Years.  Bunionectomy ([id number]) in  at 65 Years.  Excision of vaginal polyp ([id number]).  Cataract surgery ([id number]).   Social History       Alcohol  Details: Use:  Requested Prescriptions     Pending Prescriptions Disp Refills    metFORMIN (GLUCOPHAGE-XR) 500 MG extended release tablet 180 tablet 3     Sig: Take 2 tablets by mouth daily (with breakfast)    levothyroxine (SYNTHROID) 50 MCG tablet 90 tablet 3     Sig: Take 1 tablet by mouth Daily    triamterene-hydroCHLOROthiazide (MAXZIDE-25) 37.5-25 MG per tablet 90 tablet 3     Sig: Take 1 tablet by mouth daily     Rx sent in on 5/4 2/28 and 2/21 90 tabs and 180 3 refills None.  Details: Use: None.  Exercise  Details: Excercise: Occasionally.  Home/Environment  Details: Smoker in Household: No.  Patient Lives With: Spouse.  Ambulation: Required Assistance.  Bathing: Independent.  Dressing: Independent.  Driving: Independent.  Eating: Independent.  Elimination: Independent.  Grooming: Independent.  Preparing Meal: Independent.  Taking Meds: Independent.  Toileting: Independent.  Transfers: Independent.  Assistive Devices Home: Cane, Glasses, full dentures.  Substance Abuse  Details: Use: None.  Details: Use: None.  Tobacco  Details: Smoked/Smokeless Tobacco Last 30 Days: No.  Smoking Tobacco Use: Former smoker.  Smokeless Tobacco Use Never.  Stopped Age: 35 Years.  Details: Used in Last 12 Months: No.  Use: Former smoker.  Cigarette Packs/Day: 3 Packs Per Day.; Comment(s): quit 1979 - smoked for 20 years  Cultural/Faith Practices  Details: Faith or Cultural Practices: Judaism.  .       Health Status   Allergies:    Allergic Reactions (All)  Moderate  Adhesive Bandage- Welts.   Current medications:  (Selected)   Inpatient Medications  Ordered  Dextrose 5% - 0.45% NaCl - KCl 20 mEq/L premix 1,000 mL: 1,000 mL, IV, RATE: 100 mL/hr, Infuse over 10 hr, 1,000 mL TOTAL Volume, Routine, Order Start: 01/20/20 10:10:00 CST, IV Soln, Weight: 105.5 kg Clinical Weight  HYDROmorphone PCA 0.2 mg/mL (Dilaudid).: PCA dose 0.2 mg, Lockout 10 minutes, 1-hour limit 1 mg, Syringe volume 30 mL, IV PCA, PRN pain severe, Routine, Order Start: 01/20/20 10:10:00 CST, Syringe, IV PCA  Norco oral 325-10 mg tablet: 1 tab, Oral, Q4H, PRN pain moderate, Routine, Order Start: 01/20/20 10:10:00 CST, Tab  Norco oral 325-10 mg tablet: 2 tab, Oral, Q4H, PRN pain severe, Routine, Order Start: 01/20/20 10:10:00 CST, Tab  Norco oral 325-5 mg tablet: 1 tab, Oral, Q4H, PRN pain mild, Routine, Order Start: 01/20/20 10:10:00 CST, Tab  Valium oral 5 mg tablet: 5 mg = 1 tab, Oral, Q8H, PRN spasm, Routine, Order Start:  01/20/20 10:10:00 CST, Tab  acetaminophen (Tylenol).: 1,000 mg = 2 tab, Oral, Q8H, Routine, Order Start: 01/20/20 14:00:00 CST, Tab, Give around the clock. For CrCL 30 mL/min or GREATER and normal hepatic function  atorvastatin oral 20 mg tablet: 20 mg = 1 tab, Oral, Q Bedtime, Routine, Order Start: 01/20/20 21:00:00 CST, Tab  benzocaine-menthol lozenge (Cepacol / Chloraseptic).: 1 lozenge, Oral, Q4H, PRN sore throat, Routine, Order Start: 01/20/20 10:10:00 CST, Lozenge  bisacodyl (Dulcolax).: 10 mg = 1 suppository, Rectal, Daily, PRN constipation, Routine, Order Start: 01/20/20 10:10:00 CST, Suppository, If alternate oral PRN laxatives are ordered, give if no response to those laxatives or if patient is NPO  bupivacaine-epinephrine 50 mL + morphine 4 mg: IntraJoint, On Call, RATE: 0 mL/hr, Infuse over 0 hr, 51 mL TOTAL Volume, Order Start: 01/20/20 5:00:00 CST, x 1 doses  ceFAZolin (Ancef).: 2,000 mg = 15 mL, Slow IV Push, Q8H, Rationale: Surgical Prophylaxis, Indication: Other infection (see order comments), RATE: 450 mL/hr, Infuse over 2 minutes, mL TOTAL Volume 15, Routine, Order Start: 01/20/20 16:00:00 CST, x 2 doses, Order Stop: 01/...  celecoxib (CeleBREX).: 200 mg = 1 cap, Oral, Daily, Routine, Order Start: 01/21/20 9:00:00 CST, Cap, Do not give while patient taking ketorolac. Not recommended if CrCl LESS than 30 mL/min or severe hepatic impairment (Child-Bernard Class C) OR if allergic to sulfonamides, ASA...  diphenhydrAMINE (Benadryl).: 25 mg = 1 cap, Oral, Q4H, PRN itching, Routine, Order Start: 01/20/20 10:10:00 CST, Cap  docusate-senna oral 50-8.6 mg tablet (Senokot S).: 2 tab, Oral, BID, Routine, Order Start: 01/20/20 17:00:00 CST, Tab, Hold if patient is having loose, frequent stools  enoxaparin (Lovenox).: 40 mg = 0.4 mL, Subcutaneous, Daily, Routine, Order Start: 01/20/20 21:00:00 CST, Injection, Ortho VTE Prophylaxis for pts with BMI 40 or GREATER. Pharmacist to adjust dose for renal function.  Administer first dose between 11 and 23 hours after the en...  famotidine (Pepcid).: 20 mg = 1 tab, Oral, Daily, Routine, Order Start: 01/20/20 21:00:00 CST, x 72 hr, Order Stop: 01/22/20 21:00:00 CST, Tab, Pharmacy to adjust dosing per P&T approved renal dosing protocol  ketorolac (Toradol).: 30 mg = 1 mL, IV Push, Q6H, Routine, Order Start: 01/20/20 15:00:00 CST, x 3 doses, Order Stop: 01/21/20 3:00:00 CST, Injection  magnesium hydroxide (Milk of Magnesia).: 30 mL, Oral, Daily, PRN constipation, Routine, Order Start: 01/20/20 10:10:00 CST, Suspension  metoprolol succinate oral 25 mg XL tablet: 25 mg = 1 tab, Oral, Daily, Hold for HR less than 60, and/or SBP less than 90, Routine, Order Start: 01/20/20 13:12:00 CST, Tab XL  nalbuphine (Nubain).: 2.5 mg = 0.25 mL, IV Push, Q8H, PRN pruritus, Routine, Order Start: 01/20/20 10:10:00 CST, Injection  naloxone (Narcan).: 0.2 mg = 0.5 mL, IV Push, On Call, PRN respiratory distress, Routine, Order Start: 01/20/20 10:10:00 CST, Injection, Preparation: Draw up 0.4mg (1mL) NALOXONE in 10mL syringe and mix with 9mL saline. Give 0.2mg (5mL) over 2 minutes while observing the...  ondansetron (Zofran).: 4 mg = 1 tab, Oral, Q6H, PRN nausea, Routine, Order Start: 01/20/20 10:10:00 CST, Tab Disintegrating  ondansetron (Zofran).: 4 mg = 2 mL, Slow IV Push, Q6H, PRN nausea or vomiting, Routine, Order Start: 01/20/20 10:10:00 CST, Injection, For patients who cannot tolerate PO ondansetron or are NPO  polyethylene glycol 3350 (MiraLax).: 17 gm = 1 packet, Oral, Daily, Routine, Order Start: 01/20/20 12:17:00 CST, Oral Soln, Dissolve in 8 ounces of water  Documented Medications  Documented  Calcium 600+D: See Instructions, 2 gummies Oral Q Bedtime, Maintenance  Tylenol Extra Strength oral 500 mg tablet: 1,000 mg = 2 tab, Oral, Q8H, PRN pain breakthrough, pt has med at home, Tab, Maintenance  aspirin 81 mg oral tablet: 81 mg = 1 tab, Oral, Q Bedtime, Last dose 1/10/20, Maintenance   lisinopril-hydrochlorothiazide 20 mg-12.5 mg oral tablet: = 1 tab, Oral, Daily, Tab, Maintenance  luetin - zeaxathin: luetin - zeaxathin, = 1 tab, Oral, Daily, Maintenance  metoprolol succinate 25 mg oral capsule, extended release: 25 mg = 1 cap, Oral, Daily, starting 1/15/20 by cards and will continue about 3 weeks just for surgery., Maintenance  multivitamin with minerals therapeutic oral tablet: = 1 tab, Oral, Daily, Tab, Maintenance  simvastatin oral 40 mg tablet: = 1 tab, Oral, Q Bedtime, Tab,    Medications (25) Active  Scheduled: (11)  Acetaminophen 500 mg tab  1,000 mg 2 tab, Oral, Q8H  Atorvastatin 20 mg tab  20 mg 1 tab, Oral, Q Bedtime  bupivacaine-epinephrine 0.25%-1:200,000 50 mL + morphine 4 mg  50 mL, IntraJoint, On Call  ceFAZolin  2,000 mg 15 mL, Slow IV Push, Q8H  Celecoxib 200 mg cap  200 mg 1 cap, Oral, Daily  Enoxaparin 40 mg/0.4 mL syringe  40 mg 0.4 mL, Subcutaneous, Daily  Famotidine 20 mg tab  20 mg 1 tab, Oral, Daily  Ketorolac 30 mg/1 mL inj SDV  30 mg 1 mL, IV Push, Q6H  Metoprolol succinate 25 mg XL tab  25 mg 1 tab, Oral, Daily  Polyethylene glycol 3350 oral recon powder 17 gm packet UD  17 gm 1 packet, Oral, Daily  Senna-docusate sodium 8.6-50 mg tab  2 tab, Oral, BID  Continuous: (1)  Dextrose 5%-0.45% NaCl-KCl 20 mEq 1,000 mL  1,000 mL, IV, 100 mL/hr  PRN: (13)  acetaminophen-hydrocodone  1 tab, Oral, Q4H  acetaminophen-hydrocodone  1 tab, Oral, Q4H  acetaminophen-hydrocodone  2 tab, Oral, Q4H  Benzocaine-menthol lozenge 8's  1 lozenge, Oral, Q4H  Bisacodyl 10 mg suppos  10 mg 1 suppository, Rectal, Daily  DiazePAM 5 mg tab  5 mg 1 tab, Oral, Q8H  DiphenhydrAMINE 25 mg cap  25 mg 1 cap, Oral, Q4H  HYDROmorphone PCA 0.2 mg/mL-NS  6 mg 30 mL, IV PCA, As Directed PRN  Milk of magnesia 8% 30 mL oral susp UD  30 mL, Oral, Daily  Nalbuphine 10 mg/1 mL inj SDV  2.5 mg 0.25 mL, IV Push, Q8H  Naloxone 0.4 mg/1 mL inj SDV  0.2 mg 0.5 mL, IV Push, On Call  Ondansetron 4 mg disintegrating tab  4  mg 1 tab, Oral, Q6H  Ondansetron 4 mg/2 mL inj SDV  4 mg 2 mL, Slow IV Push, Q6H  , Home Medications (8) Active  aspirin 81 mg oral tablet 81 mg = 1 tab, Oral, Q Bedtime  Calcium 600+D See Instructions  lisinopril-hydrochlorothiazide 20 mg-12.5 mg oral tablet 1 tab, Oral, Daily  luetin - zeaxathin 1 tab, Oral, Daily  metoprolol succinate 25 mg oral capsule, extended release 25 mg = 1 cap, Oral, Daily  multivitamin with minerals therapeutic oral tablet 1 tab, Oral, Daily  simvastatin oral 40 mg tablet 1 tab, Oral, Q Bedtime  Tylenol Extra Strength oral 500 mg tablet 1,000 mg = 2 tab, PRN, Oral, Q8H       Physical Examination   VS/Measurements     Vitals between:   19-JAN-2020 15:14:55   TO   20-JAN-2020 15:14:55                   LAST RESULT MINIMUM MAXIMUM  Temperature 35.6 35.5 36.6  Heart Rate 66 51 91  Respiratory Rate 14 11 20  NISBP           115 107 166  NIDBP           67 54 78  NIMBP           81 76 107  SpO2                    100 87 100  , Measurements from flowsheet : Height and Weight   01/20/20 05:32 CST CLINICALWEIGHT 105.5 kg    MEDDOSEWT 105.5 kg    CLINICALHEIGHT 154 cm    Height Method Stated    Weight Scale Standing    BSA - Medical History 2.01    BMI-Medical History 44.5 kg/m2   01/20/20 05:31 CST MEDDOSEWT 105.5 kg    CLINICALHEIGHT 154 cm    Height Method Stated    BSA - Medical History 2.01       General:  Alert and oriented, No acute distress.    Eye:  Pupils are equal, round and reactive to light, Extraocular movements are intact.   HENT:  Normocephalic.    Neck:  Supple.    Respiratory:  Lungs are clear to auscultation, Respirations are non-labored.   Cardiovascular:  Normal rate, Regular rhythm, No murmur.    Gastrointestinal:  Soft, Non-tender, Non-distended.    Musculoskeletal:  + left knee is wrapped .    Integumentary:  No rash.    Neurologic:  No focal deficits, Cranial Nerves II-XII are grossly intact.   Cognition and Speech:  Speech clear and coherent.    Psychiatric:  Cooperative.       Review / Management   Laboratory results:     Labs between:  19-JAN-2020 15:14 to 20-JAN-2020 15:14  POC GLU:                 Latest Result  Latest Date  Minimum  Min Date  Maximum  Max Date                             (H) 133  20-JAN-2020 (H) 133  20-JAN-2020 (H) 133                              .    Radiology results   Result title:  XR KNEE LT 2V  Result status:  Final  Verified by:  JEREMY SHULTZ on 01/20/2020 0:41  IMPRESSION: Postoperative knee replacement, anatomic position.      Lines and Tubes:    LINES  Peripheral Intravenous Hand Left   Gauge: 18   Charted: 01/20/20 11:37  Inserted: 01/20/20   Days Since Insertion: 0 days  Indication of Use: Hydration  DRAINS AND TUBES  Drains Hemovac left knee   Drainage Action: Suction Compressed And Closed   Charted: 01/20/20 11:37  Inserted: 01/20/20   Days Since Insertion: 0 days   .    A/P  77 yo F with HTN, HLD, knee OA admitted post op after L TKA  Knee OA s/p L TKA  apprec ortho management  pain control   PT eval  Monitor for ABLA  Lovenox for VTE ppx per ortho  HTN/HLD   hold HCTZ/triamterene - resume as able   cont metoprolol   resume statin  VTE ppx: lovenox  Dispo: Admit inpatient  ADOD tbd

## 2023-01-20 RX ORDER — LEVOTHYROXINE SODIUM 0.05 MG/1
50 TABLET ORAL DAILY
Qty: 90 TABLET | Refills: 0 | Status: SHIPPED | OUTPATIENT
Start: 2023-01-20 | End: 2023-03-17 | Stop reason: SDUPTHER

## 2023-01-20 NOTE — TELEPHONE ENCOUNTER
No future appointments.   LOV 2/21/2022      Requested Prescriptions     Pending Prescriptions Disp Refills    levothyroxine (SYNTHROID) 50 MCG tablet [Pharmacy Med Name: LEVOTHYROXINE 50 MCG TABLET] 30 tablet 1     Sig: TAKE 1 TABLET BY MOUTH DAILY

## 2023-02-12 PROBLEM — N93.8 DUB (DYSFUNCTIONAL UTERINE BLEEDING): Status: RESOLVED | Noted: 2020-01-26 | Resolved: 2023-02-12

## 2023-02-13 NOTE — PROGRESS NOTES
Subjective:      Patient ID: Amy Finch is a 47 y.o. female is here for her annual wellness exam.     HPI    PAP + HPV negative 1/27/20. No hx abnormal PAP  One year since last menses. No hot flashes or night sweats. Is concerned about wt. Mammogram: normal 3/8/21. FH + breast cancer maternal [place] age 62  Colon cancer screening:  normal colonoscopy 5/21/21.  5 year follow up recommended for FH colon cancer in [place] if also FH polyps - dad had polyps. Vaccines: due COVID and flu. Diet: eating a very healthy diet, watching portions. Exercise: on and off. Walks most days, weights, Yoga. Sleeps 8 hours. Wakes up a lot. Does meditation. Hypothyroid and goiter:  US 2016 showed small stable nodules. Impaired glucose tolerance:  HGA1C 5.7 in 2019. Normal since.     The 10-year ASCVD risk score (Baldomero GILLIAM, et al., 2019) is: 0.8%    Values used to calculate the score:      Age: 47 years      Sex: Female      Is Non- : No      Diabetic: No      Tobacco smoker: No      Systolic Blood Pressure: 799 mmHg      Is BP treated: No      HDL Cholesterol: 73 mg/dL      Total Cholesterol: 202 mg/dL     The 10-year ASCVD risk score (Baldomero GILLIAM, et al., 2019) is: 0.8%    Values used to calculate the score:      Age: 47 years      Sex: Female      Is Non- : No      Diabetic: No      Tobacco smoker: No      Systolic Blood Pressure: 915 mmHg      Is BP treated: No      HDL Cholesterol: 73 mg/dL      Total Cholesterol: 202 mg/dL     Health Maintenance   Topic Date Due    COVID-19 Vaccine (4 - Booster for Moderna series) 12/20/2021    Flu vaccine (1) 08/01/2022    A1C test (Diabetic or Prediabetic)  02/21/2023    Depression Screen  02/21/2023    Breast cancer screen  03/08/2023    DTaP/Tdap/Td vaccine (2 - Td or Tdap) 01/16/2025    Cervical cancer screen  01/27/2025    Colorectal Cancer Screen  05/21/2026    Lipids  02/21/2027    Shingles vaccine  Completed    Hepatitis C screen  Completed    HIV screen  Completed    Hepatitis A vaccine  Aged Out    Hib vaccine  Aged Out    Meningococcal (ACWY) vaccine  Aged Out    Pneumococcal 0-64 years Vaccine  Aged Out           Outpatient Medications Marked as Taking for the 2/17/23 encounter (Office Visit) with Diomedes Ramirez MD   Medication Sig Dispense Refill    levothyroxine (SYNTHROID) 50 MCG tablet TAKE 1 TABLET BY MOUTH DAILY 90 tablet 0    triamterene-hydroCHLOROthiazide (MAXZIDE-25) 37.5-25 MG per tablet TAKE 1 TABLET BY MOUTH DAILY 90 tablet 3    metFORMIN (GLUCOPHAGE-XR) 500 MG extended release tablet Take 2 tablets by mouth daily (with breakfast) 180 tablet 3    vitamin B-12 (CYANOCOBALAMIN) 1000 MCG tablet Take 1,000 mcg by mouth every other day      cetirizine (ZYRTEC) 10 MG tablet Take 10 mg by mouth daily      magnesium 30 MG tablet Take 30 mg by mouth 2 times daily         Allergies   Allergen Reactions    Ampicillin Hives       Patient Active Problem List   Diagnosis    Acquired hypothyroidism    Goiter    Anxiety    Class 1 obesity due to excess calories without serious comorbidity with body mass index (BMI) of 33.0 to 33.9 in adult    Patellofemoral arthralgia of both knees    Prediabetes    Right ovarian cyst        Past Medical History:   Diagnosis Date    Attention deficit disorder (ADD) without hyperactivity 12/22/2017    Blood clot in abdominal vein 07/1994    superior mesentary artery with appendicitis, sepsis    DUB (dysfunctional uterine bleeding) 1/26/2020    Goiter        Past Surgical History:   Procedure Laterality Date    ABDOMINOPLASTY  10/2013    diastasis rectus, hernia repair    APPENDECTOMY  07/1994       Social History     Tobacco Use    Smoking status: Never    Smokeless tobacco: Never   Substance Use Topics    Alcohol use: Yes     Comment: 0 - 1 per day    Drug use: No       Family History   Problem Relation Age of Onset    Breast Cancer Maternal Grandmother 62    Heart Attack Maternal Grandfather 50 morbidly obese    Colon Cancer Paternal Grandmother 80    COPD Paternal Grandfather     Heart Attack Maternal Uncle 50        sudden cardiac death, healthy       Review of Systems  Review of Systems   Constitutional:  Negative for activity change, appetite change, fatigue and unexpected weight change. HENT:  Negative for congestion, hearing loss, nosebleeds, sore throat, tinnitus, trouble swallowing and voice change. Eyes:  Negative for visual disturbance. Respiratory:  Negative for choking, chest tightness, shortness of breath and wheezing. Cardiovascular:  Negative for chest pain, palpitations and leg swelling. Gastrointestinal:  Positive for diarrhea. Negative for abdominal pain, anal bleeding, blood in stool, constipation and nausea. Mild intermittent diarrhea. Stable. Genitourinary:  Negative for dysuria, flank pain, frequency, hematuria, pelvic pain, vaginal bleeding and vaginal discharge. Musculoskeletal:  Positive for arthralgias. Negative for myalgias. Knee pain; had Synvisc. Helped temporarily. Takes occ Voltaren. Skin:  Negative for color change and rash. Would like to see derm for lesion on her face. No changes. Neurological:  Negative for headaches. Hematological:  Negative for adenopathy. Does not bruise/bleed easily. Psychiatric/Behavioral:  Negative for dysphoric mood and sleep disturbance. The patient is not nervous/anxious.     .  Lab Results   Component Value Date     02/21/2022    K 4.3 02/21/2022     02/21/2022    CO2 24 02/21/2022    BUN 17 02/21/2022    CREATININE 0.9 02/21/2022    GLUCOSE 94 02/21/2022    CALCIUM 9.3 02/21/2022    PROT 6.8 02/21/2022    LABALBU 4.3 02/21/2022    BILITOT 0.4 02/21/2022    ALKPHOS 62 02/21/2022    AST 12 (L) 02/21/2022    ALT 8 (L) 02/21/2022    LABGLOM >60 02/21/2022    GFRAA >60 02/21/2022    AGRATIO 1.7 02/21/2022    GLOB 3.0 01/25/2021       Lab Results   Component Value Date    WBC 5.0 02/21/2022    HGB 13.1 02/21/2022    HCT 38.9 02/21/2022    MCV 88.4 02/21/2022     02/21/2022     TSH   Date Value Ref Range Status   02/21/2022 2.14 0.27 - 4.20 uIU/mL Final   01/25/2021 2.37 0.27 - 4.20 uIU/mL Final   01/16/2020 1.55 0.27 - 4.20 uIU/mL Final     Lab Results   Component Value Date    CHOL 202 (H) 02/21/2022    CHOL 199 01/25/2021    CHOL 195 10/25/2019     Lab Results   Component Value Date    TRIG 54 02/21/2022    TRIG 123 01/25/2021    TRIG 75 10/25/2019     Lab Results   Component Value Date    HDL 73 (H) 02/21/2022    HDL 57 01/25/2021    HDL 69 (H) 10/25/2019     Lab Results   Component Value Date    LDLCALC 118 (H) 02/21/2022    LDLCALC 117 (H) 01/25/2021    LDLCALC 111 (H) 10/25/2019     Lab Results   Component Value Date    LABVLDL 11 02/21/2022    LABVLDL 25 01/25/2021    LABVLDL 15 10/25/2019     No results found for: CHOLHDLRATIO   Objective:   Physical Exam  .  Vitals:    02/17/23 1457   BP: 102/68   Pulse: 85   Resp: 14   Temp: 97.9 °F (36.6 °C)   SpO2: 98%     Wt Readings from Last 3 Encounters:   02/17/23 194 lb 12.8 oz (88.4 kg)   02/21/22 173 lb (78.5 kg)   01/25/21 191 lb (86.6 kg)        Physical Exam  Constitutional:       Appearance: Normal appearance. She is well-developed. HENT:      Head: Normocephalic and atraumatic. Right Ear: Hearing, tympanic membrane, ear canal and external ear normal.      Left Ear: Hearing, tympanic membrane, ear canal and external ear normal.      Nose: Nose normal.   Eyes:      General: Lids are normal.      Conjunctiva/sclera: Conjunctivae normal.   Neck:      Thyroid: No thyroid mass or thyromegaly. Trachea: Trachea normal.   Cardiovascular:      Rate and Rhythm: Normal rate and regular rhythm. Pulses: Normal pulses. Heart sounds: Normal heart sounds. No murmur heard. Pulmonary:      Effort: Pulmonary effort is normal.      Breath sounds: Normal breath sounds.    Abdominal:      General: Bowel sounds are normal. Palpations: Abdomen is soft. Tenderness: There is no abdominal tenderness. Hernia: No hernia is present. Musculoskeletal:      Cervical back: Neck supple. Lymphadenopathy:      Head:      Right side of head: No submandibular adenopathy. Left side of head: No submandibular adenopathy. Cervical: No cervical adenopathy. Skin:     General: Skin is warm and dry. Findings: No lesion or rash. Comments: No abnormal appearing lesions on exposed skin   Neurological:      Mental Status: She is alert and oriented to person, place, and time. Gait: Gait normal.      Deep Tendon Reflexes:      Reflex Scores:       Patellar reflexes are 2+ on the right side and 2+ on the left side. Psychiatric:         Speech: Speech normal.         Behavior: Behavior normal.         Judgment: Judgment normal.         Assessment and Plan       Diagnosis Orders   1. Well adult exam  Comprehensive Metabolic Panel    CBC    TSH with Reflex    Lipid Panel  Discussed diet, exercise   Calcium and Vitamin D addressed  PAP per gyne  Annual to biannual mammogram  Annual influenza vaccine  Dt every 10 years  COVID booster encouraged  Colonoscopy every 10 years until age 75  Dexa scan every 3 to 5 years       2. Goiter  TSH       3. Acquired hypothyroidism  TSH  Continue levothyroxine       4. Breast cancer screening by mammogram  Has scheduled. 5. Impaired glucose tolerance  Hemoglobin A1C  Discussed diet, exercise and weight loss strategies       6. Prediabetes  metFORMIN (GLUCOPHAGE-XR) 500 MG extended release tablet      7. Primary osteoarthritis of both knees  diclofenac (VOLTAREN) 75 MG EC tablet    External Referral To Orthopedic Surgery      8. Overweight (BMI 25.0-29. 9)  Phentermine-Topiramate (QSYMIA) 3.75-23 MG CP24  PDMP reviewed and no concerns noted. Advised risks of stimulants include addiction. .  She agrees not to obtain prescriptions for stimulant medications from other providers. Discussed diet, exercise and weight loss strategies         9. Skin lesion  External Referral To Dermatology      10. Pedal edema  triamterene-hydroCHLOROthiazide (MAXZIDE-25) 37.5-25 MG per tablet       Side effects of current medications reviewed and questions answered. Follow up in 4 weeks or prn.

## 2023-02-17 ENCOUNTER — OFFICE VISIT (OUTPATIENT)
Dept: FAMILY MEDICINE CLINIC | Age: 53
End: 2023-02-17
Payer: COMMERCIAL

## 2023-02-17 VITALS
HEIGHT: 68 IN | BODY MASS INDEX: 29.52 KG/M2 | WEIGHT: 194.8 LBS | TEMPERATURE: 97.9 F | SYSTOLIC BLOOD PRESSURE: 102 MMHG | HEART RATE: 85 BPM | OXYGEN SATURATION: 98 % | RESPIRATION RATE: 14 BRPM | DIASTOLIC BLOOD PRESSURE: 68 MMHG

## 2023-02-17 DIAGNOSIS — R60.0 PEDAL EDEMA: ICD-10-CM

## 2023-02-17 DIAGNOSIS — E04.9 GOITER: ICD-10-CM

## 2023-02-17 DIAGNOSIS — L98.9 SKIN LESION: ICD-10-CM

## 2023-02-17 DIAGNOSIS — E03.9 ACQUIRED HYPOTHYROIDISM: ICD-10-CM

## 2023-02-17 DIAGNOSIS — E66.3 OVERWEIGHT (BMI 25.0-29.9): ICD-10-CM

## 2023-02-17 DIAGNOSIS — Z12.31 BREAST CANCER SCREENING BY MAMMOGRAM: ICD-10-CM

## 2023-02-17 DIAGNOSIS — R73.02 IMPAIRED GLUCOSE TOLERANCE: ICD-10-CM

## 2023-02-17 DIAGNOSIS — Z00.00 WELL ADULT EXAM: Primary | ICD-10-CM

## 2023-02-17 DIAGNOSIS — M17.0 PRIMARY OSTEOARTHRITIS OF BOTH KNEES: ICD-10-CM

## 2023-02-17 PROCEDURE — 99396 PREV VISIT EST AGE 40-64: CPT | Performed by: FAMILY MEDICINE

## 2023-02-17 RX ORDER — LEVOTHYROXINE SODIUM 0.05 MG/1
50 TABLET ORAL DAILY
Qty: 90 TABLET | Refills: 0 | Status: CANCELLED | OUTPATIENT
Start: 2023-02-17

## 2023-02-17 RX ORDER — METFORMIN HYDROCHLORIDE 500 MG/1
1000 TABLET, EXTENDED RELEASE ORAL
Qty: 180 TABLET | Refills: 3 | Status: SHIPPED | OUTPATIENT
Start: 2023-02-17

## 2023-02-17 RX ORDER — DICLOFENAC SODIUM 75 MG/1
75 TABLET, DELAYED RELEASE ORAL 2 TIMES DAILY
Qty: 60 TABLET | Refills: 5 | Status: SHIPPED | OUTPATIENT
Start: 2023-02-17

## 2023-02-17 RX ORDER — TRIAMTERENE AND HYDROCHLOROTHIAZIDE 37.5; 25 MG/1; MG/1
1 TABLET ORAL DAILY
Qty: 90 TABLET | Refills: 3 | Status: SHIPPED | OUTPATIENT
Start: 2023-02-17

## 2023-02-17 RX ORDER — LANOLIN ALCOHOL/MO/W.PET/CERES
1000 CREAM (GRAM) TOPICAL EVERY OTHER DAY
Qty: 30 TABLET | Status: CANCELLED | OUTPATIENT
Start: 2023-02-17

## 2023-02-17 RX ORDER — PHENTERMINE AND TOPIRAMATE 3.75; 23 MG/1; MG/1
1 CAPSULE, EXTENDED RELEASE ORAL EVERY MORNING
Qty: 30 CAPSULE | Refills: 0 | Status: SHIPPED | OUTPATIENT
Start: 2023-02-17 | End: 2023-03-19

## 2023-02-17 SDOH — ECONOMIC STABILITY: FOOD INSECURITY: WITHIN THE PAST 12 MONTHS, YOU WORRIED THAT YOUR FOOD WOULD RUN OUT BEFORE YOU GOT MONEY TO BUY MORE.: NEVER TRUE

## 2023-02-17 SDOH — ECONOMIC STABILITY: HOUSING INSECURITY
IN THE LAST 12 MONTHS, WAS THERE A TIME WHEN YOU DID NOT HAVE A STEADY PLACE TO SLEEP OR SLEPT IN A SHELTER (INCLUDING NOW)?: NO

## 2023-02-17 SDOH — ECONOMIC STABILITY: INCOME INSECURITY: HOW HARD IS IT FOR YOU TO PAY FOR THE VERY BASICS LIKE FOOD, HOUSING, MEDICAL CARE, AND HEATING?: NOT HARD AT ALL

## 2023-02-17 SDOH — ECONOMIC STABILITY: FOOD INSECURITY: WITHIN THE PAST 12 MONTHS, THE FOOD YOU BOUGHT JUST DIDN'T LAST AND YOU DIDN'T HAVE MONEY TO GET MORE.: NEVER TRUE

## 2023-02-17 ASSESSMENT — ENCOUNTER SYMPTOMS
CHEST TIGHTNESS: 0
CONSTIPATION: 0
WHEEZING: 0
DIARRHEA: 1
TROUBLE SWALLOWING: 0
SHORTNESS OF BREATH: 0
SORE THROAT: 0
VOICE CHANGE: 0
ABDOMINAL PAIN: 0
CHOKING: 0
NAUSEA: 0
ANAL BLEEDING: 0
COLOR CHANGE: 0
BLOOD IN STOOL: 0

## 2023-02-17 ASSESSMENT — PATIENT HEALTH QUESTIONNAIRE - PHQ9
1. LITTLE INTEREST OR PLEASURE IN DOING THINGS: 0
2. FEELING DOWN, DEPRESSED OR HOPELESS: 0
SUM OF ALL RESPONSES TO PHQ9 QUESTIONS 1 & 2: 0
SUM OF ALL RESPONSES TO PHQ QUESTIONS 1-9: 0

## 2023-02-28 ENCOUNTER — TELEPHONE (OUTPATIENT)
Dept: FAMILY MEDICINE CLINIC | Age: 53
End: 2023-02-28

## 2023-02-28 DIAGNOSIS — E66.3 OVERWEIGHT (BMI 25.0-29.9): ICD-10-CM

## 2023-02-28 NOTE — TELEPHONE ENCOUNTER
Requested Prescriptions     Pending Prescriptions Disp Refills    Phentermine-Topiramate (QSYMIA) 3.75-23 MG CP24 30 capsule 0     Sig: Take 1 tablet by mouth every morning for 30 days.  Max Daily Amount: 1 tablet       Please start PA

## 2023-03-01 RX ORDER — PHENTERMINE AND TOPIRAMATE 3.75; 23 MG/1; MG/1
1 CAPSULE, EXTENDED RELEASE ORAL EVERY MORNING
Qty: 30 CAPSULE | Refills: 0 | Status: SHIPPED | OUTPATIENT
Start: 2023-03-01 | End: 2023-03-31

## 2023-03-01 NOTE — TELEPHONE ENCOUNTER
The medication is APPROVED. If this requires a response please respond to the pool ( [id number] [facility]). Thank you please advise patient.

## 2023-03-14 NOTE — PROGRESS NOTES
Subjective:      Patient ID: Lezlie Severs 47 y.o. female. The primary encounter diagnosis was Class 1 obesity due to excess calories without serious comorbidity with body mass index (BMI) of 33.0 to 33.9 in adult. A diagnosis of Overweight (BMI 25.0-29.9) was also pertinent to this visit. IRAJ Roche is here for follow up after starting Qsymia for obesity. Had mild elevation HGA1C (5.7) in 2019, normal since. Eats a healthy diet, exercises regularly and gets adequate sleep. Is not taking any medications that effect weight. She does not notice much benefit. She does note she feels a bit less hungry. She eats well and exercises regularly. Denies palpitations, irritability, anxiety, insomnia related to Qsymia. Hemoglobin A1C   Date Value Ref Range Status   02/25/2023 5.3 See comment % Final     Comment:     Comment:  Diagnosis of Diabetes: > or = 6.5%  Increased risk of diabetes (Prediabetes): 5.7-6.4%  Glycemic Control: Nonpregnant Adults: <7.0%                    Pregnant: <6.0%          TSH   Date Value Ref Range Status   02/25/2023 2.22 0.27 - 4.20 uIU/mL Final   02/21/2022 2.14 0.27 - 4.20 uIU/mL Final   01/25/2021 2.37 0.27 - 4.20 uIU/mL Final           Outpatient Medications Marked as Taking for the 3/17/23 encounter (Office Visit) with Claudia Callahan MD   Medication Sig Dispense Refill    Phentermine-Topiramate (QSYMIA) 3.75-23 MG CP24 Take 1 tablet by mouth every morning for 30 days.  Max Daily Amount: 1 tablet 30 capsule 0    triamterene-hydroCHLOROthiazide (MAXZIDE-25) 37.5-25 MG per tablet Take 1 tablet by mouth daily 90 tablet 3    metFORMIN (GLUCOPHAGE-XR) 500 MG extended release tablet Take 2 tablets by mouth daily (with breakfast) 180 tablet 3    diclofenac (VOLTAREN) 75 MG EC tablet Take 1 tablet by mouth 2 times daily 60 tablet 5    levothyroxine (SYNTHROID) 50 MCG tablet TAKE 1 TABLET BY MOUTH DAILY 90 tablet 0    vitamin B-12 (CYANOCOBALAMIN) 1000 MCG tablet Take 1,000 mcg by mouth every other day      cetirizine (ZYRTEC) 10 MG tablet Take 10 mg by mouth daily      magnesium 30 MG tablet Take 30 mg by mouth 2 times daily          Allergies   Allergen Reactions    Ampicillin Hives       Patient Active Problem List   Diagnosis    Acquired hypothyroidism    Goiter    Anxiety    Class 1 obesity due to excess calories without serious comorbidity with body mass index (BMI) of 33.0 to 33.9 in adult    Patellofemoral arthralgia of both knees    Prediabetes    Right ovarian cyst    Overweight (BMI 25.0-29. 9)       Past Medical History:   Diagnosis Date    Attention deficit disorder (ADD) without hyperactivity 12/22/2017    Blood clot in abdominal vein 07/1994    superior mesentary artery with appendicitis, sepsis    DUB (dysfunctional uterine bleeding) 1/26/2020    Goiter        Past Surgical History:   Procedure Laterality Date    ABDOMINOPLASTY  10/2013    diastasis rectus, hernia repair    APPENDECTOMY  07/1994        Family History   Problem Relation Age of Onset    Colon Polyps Father     Breast Cancer Maternal Grandmother 62    Heart Attack Maternal Grandfather 48        morbidly obese    Colon Cancer Paternal Grandmother 80    COPD Paternal Grandfather     Heart Attack Maternal Uncle 50        sudden cardiac death, healthy       Social History     Tobacco Use    Smoking status: Never    Smokeless tobacco: Never   Substance Use Topics    Alcohol use: Yes     Comment: 0 - 1 per day    Drug use: No            Review of Systems  Review of Systems    Objective:   Physical Exam  Vitals:    03/17/23 1328   BP: 112/68   Pulse: 80   Resp: 14   Temp: 97.8 °F (36.6 °C)   TempSrc: Temporal   SpO2: 99%   Weight: 191 lb 6.4 oz (86.8 kg)     Wt Readings from Last 3 Encounters:   03/17/23 191 lb 6.4 oz (86.8 kg)   02/17/23 194 lb 12.8 oz (88.4 kg)   02/21/22 173 lb (78.5 kg)        Physical Exam  NAD    Skin is warm and dry. No rash. Well hydrated  Alert and oriented x 3.   Mood and affect are normal.  The neck is supple and free of adenopathy or masses, the thyroid is normal without enlargement or nodules. Chest: clear with no wheezes or rales. No retractions, or use of accessory muscles noted. Cardiovascular: PMI is not displaced, and no thrill noted. Regular rate and rhythm with no rub, murmur or gallop. No peripheral edema. Assessment:       Diagnosis Orders   1. Overweight (BMI 25.0-29. 9)  Phentermine-Topiramate (QSYMIA) 7.5-46 MG CP24      Doing well. Discussed diet, exercise and weight loss strategies        Plan:      Side effects of current medications reviewed and questions answered. Follow up in 4 weeks or prn.

## 2023-03-17 ENCOUNTER — OFFICE VISIT (OUTPATIENT)
Dept: FAMILY MEDICINE CLINIC | Age: 53
End: 2023-03-17
Payer: COMMERCIAL

## 2023-03-17 VITALS
OXYGEN SATURATION: 99 % | WEIGHT: 191.4 LBS | SYSTOLIC BLOOD PRESSURE: 112 MMHG | DIASTOLIC BLOOD PRESSURE: 68 MMHG | TEMPERATURE: 97.8 F | BODY MASS INDEX: 29.1 KG/M2 | HEART RATE: 80 BPM | RESPIRATION RATE: 14 BRPM

## 2023-03-17 DIAGNOSIS — E66.3 OVERWEIGHT (BMI 25.0-29.9): Primary | ICD-10-CM

## 2023-03-17 PROCEDURE — 99214 OFFICE O/P EST MOD 30 MIN: CPT | Performed by: FAMILY MEDICINE

## 2023-03-17 RX ORDER — LEVOTHYROXINE SODIUM 0.05 MG/1
50 TABLET ORAL DAILY
Qty: 90 TABLET | Refills: 0 | Status: SHIPPED | OUTPATIENT
Start: 2023-03-17

## 2023-03-17 RX ORDER — PHENTERMINE AND TOPIRAMATE 3.75; 23 MG/1; MG/1
1 CAPSULE, EXTENDED RELEASE ORAL EVERY MORNING
Qty: 30 CAPSULE | Refills: 0 | Status: CANCELLED | OUTPATIENT
Start: 2023-03-17 | End: 2023-04-16

## 2023-03-17 RX ORDER — PHENTERMINE AND TOPIRAMATE 7.5; 46 MG/1; MG/1
1 CAPSULE, EXTENDED RELEASE ORAL DAILY
Qty: 30 CAPSULE | Refills: 0 | Status: SHIPPED | OUTPATIENT
Start: 2023-03-17 | End: 2023-04-16

## 2023-04-17 ENCOUNTER — OFFICE VISIT (OUTPATIENT)
Dept: FAMILY MEDICINE CLINIC | Age: 53
End: 2023-04-17
Payer: COMMERCIAL

## 2023-04-17 VITALS
HEIGHT: 68 IN | SYSTOLIC BLOOD PRESSURE: 110 MMHG | OXYGEN SATURATION: 99 % | WEIGHT: 185.8 LBS | HEART RATE: 73 BPM | BODY MASS INDEX: 28.16 KG/M2 | TEMPERATURE: 97.3 F | DIASTOLIC BLOOD PRESSURE: 72 MMHG

## 2023-04-17 DIAGNOSIS — E66.3 OVERWEIGHT (BMI 25.0-29.9): ICD-10-CM

## 2023-04-17 DIAGNOSIS — R39.15 URINARY URGENCY: Primary | ICD-10-CM

## 2023-04-17 DIAGNOSIS — R73.03 PREDIABETES: ICD-10-CM

## 2023-04-17 LAB
BILIRUBIN, POC: NEGATIVE
BLOOD URINE, POC: NEGATIVE
CLARITY, POC: CLEAR
COLOR, POC: YELLOW
GLUCOSE URINE, POC: NEGATIVE
KETONES, POC: NEGATIVE
LEUKOCYTE EST, POC: NORMAL
NITRITE, POC: NEGATIVE
PH, POC: 5.5
PROTEIN, POC: NEGATIVE
SPECIFIC GRAVITY, POC: 1.01
UROBILINOGEN, POC: NORMAL

## 2023-04-17 PROCEDURE — 99214 OFFICE O/P EST MOD 30 MIN: CPT | Performed by: NURSE PRACTITIONER

## 2023-04-17 PROCEDURE — 81002 URINALYSIS NONAUTO W/O SCOPE: CPT | Performed by: NURSE PRACTITIONER

## 2023-04-17 ASSESSMENT — ENCOUNTER SYMPTOMS
SHORTNESS OF BREATH: 0
BLOOD IN STOOL: 0
EYE REDNESS: 0
SORE THROAT: 0
VOMITING: 0
RHINORRHEA: 0
SINUS PRESSURE: 0
CHEST TIGHTNESS: 0
COUGH: 0
NAUSEA: 0
COLOR CHANGE: 0
DIARRHEA: 0
WHEEZING: 0
EYE ITCHING: 0
BACK PAIN: 0
CONSTIPATION: 0
ABDOMINAL PAIN: 0

## 2023-04-17 NOTE — ASSESSMENT & PLAN NOTE
discussed treatment options. She would like to try 3 mg Rybelsus again as she is prediabetic sample provided with her in the office today explained that after first 4 weeks the 3 mg is not a therapeutic dose but rather a medication dose to ensure that she is stable on the medication without significant side effects at therapeutic effect started at the 7 mg dose. Patient verbalized we will follow-up in 4 weeks. Also discussed other weight management options with injectable GLP-1's. Encouraged her to continue with diet and exercise and lifestyle modifications.

## 2023-04-17 NOTE — PROGRESS NOTES
Hao Hurt (:  1970) is a 47 y.o. female,New patient, here for evaluation of the following chief complaint(s):  New Patient (Establish care ), Urinary Urgency, Weight Management, and Discuss Medications (Qysmia )      ASSESSMENT/PLAN:  1. Urinary urgency  Assessment & Plan:   ua negative in office  Push fluids  Trace leuks   Send for culture     Orders:  -     POCT Urinalysis no Micro  -     Culture, Urine  2. Overweight (BMI 25.0-29. 9)  Assessment & Plan:  discussed treatment options. She would like to try 3 mg Rybelsus again as she is prediabetic sample provided with her in the office today explained that after first 4 weeks the 3 mg is not a therapeutic dose but rather a medication dose to ensure that she is stable on the medication without significant side effects at therapeutic effect started at the 7 mg dose. Patient verbalized we will follow-up in 4 weeks. Also discussed other weight management options with injectable GLP-1's. Encouraged her to continue with diet and exercise and lifestyle modifications. 3. Prediabetes  Assessment & Plan:   rybelsus sample provided  Follow up 4 weeks       No follow-ups on file. SUBJECTIVE/OBJECTIVE:  Patient the office today as a new patient to establish care as previous provider has left the practice. She does report some urinary symptoms of Urgency worse at night'  Incomplete emptying. No burning. No vaginal discharge    She has been trying to work on her weight loss.   She has tried various options in the past.  Weight management was on qysimia about 1.5 months ago took 7.5 then upped to 15 and nausea, constipatin, sleep trouble, hands and feet tingling  Has taken rybelsus in the past and only lost 5 lbs in 3 months     Has tied to eat a healthy diet;reports not eating much   Takes daily walks       Current Outpatient Medications   Medication Sig Dispense Refill    levothyroxine (SYNTHROID) 50 MCG tablet Take 1 tablet by mouth Daily 90 tablet 0

## 2023-04-20 LAB
BACTERIA UR CULT: ABNORMAL
ORGANISM: ABNORMAL

## 2023-04-20 RX ORDER — ORAL SEMAGLUTIDE 7 MG/1
7 TABLET ORAL DAILY
Qty: 30 TABLET | Refills: 1 | Status: SHIPPED | OUTPATIENT
Start: 2023-04-20 | End: 2023-04-20

## 2023-04-20 RX ORDER — ORAL SEMAGLUTIDE 7 MG/1
7 TABLET ORAL DAILY
Qty: 90 TABLET | Refills: 1 | Status: SHIPPED | OUTPATIENT
Start: 2023-04-20 | End: 2023-05-08 | Stop reason: DRUGHIGH

## 2023-05-04 ENCOUNTER — PATIENT MESSAGE (OUTPATIENT)
Dept: FAMILY MEDICINE CLINIC | Age: 53
End: 2023-05-04

## 2023-05-08 RX ORDER — ORAL SEMAGLUTIDE 14 MG/1
14 TABLET ORAL DAILY
Qty: 90 TABLET | Refills: 0 | Status: SHIPPED | OUTPATIENT
Start: 2023-05-08

## 2023-05-08 NOTE — TELEPHONE ENCOUNTER
From: Audra Diamond  To: Johana Edouard  Sent: 5/4/2023 5:19 PM EDT  Subject: 5/11 appointment    Hello,    I have an appt scheduled 5/11 Im assuming it was a f/u about the Rybelsus? I received the 7mg prescription and started taking it a week ago. Its going ok, Im not noticing any change in appetite and havent lost any weight. Do I need to come in for an appointment to increase the dose or can you do that without seeing me? My insurance is not great and it costs me $145 out of pocket every time I come in. And I saw Dr. Keri Sahu 2-3 times for the Qsymia and will see you for the 2nd time on 5/11 so I just wanted to make sure it was necessary.      Thanks in advance,  Zaid Bowman

## 2023-07-12 RX ORDER — LEVOTHYROXINE SODIUM 0.05 MG/1
TABLET ORAL
Qty: 90 TABLET | Refills: 0 | Status: SHIPPED | OUTPATIENT
Start: 2023-07-12

## 2023-07-19 RX ORDER — SEMAGLUTIDE 0.25 MG/.5ML
0.25 INJECTION, SOLUTION SUBCUTANEOUS
Qty: 2 ML | Refills: 0 | Status: SHIPPED | OUTPATIENT
Start: 2023-07-19 | End: 2023-08-10

## 2023-09-26 ENCOUNTER — OFFICE VISIT (OUTPATIENT)
Dept: FAMILY MEDICINE CLINIC | Age: 53
End: 2023-09-26
Payer: COMMERCIAL

## 2023-09-26 VITALS
OXYGEN SATURATION: 99 % | HEIGHT: 68 IN | BODY MASS INDEX: 26.98 KG/M2 | SYSTOLIC BLOOD PRESSURE: 110 MMHG | HEART RATE: 73 BPM | DIASTOLIC BLOOD PRESSURE: 70 MMHG | TEMPERATURE: 98.5 F | WEIGHT: 178 LBS

## 2023-09-26 DIAGNOSIS — R30.0 DYSURIA: Primary | ICD-10-CM

## 2023-09-26 DIAGNOSIS — R31.0 GROSS HEMATURIA: ICD-10-CM

## 2023-09-26 LAB
BILIRUBIN, POC: NORMAL
BLOOD URINE, POC: NORMAL
CLARITY, POC: CLEAR
COLOR, POC: YELLOW
GLUCOSE URINE, POC: NORMAL
KETONES, POC: NORMAL
LEUKOCYTE EST, POC: NORMAL
NITRITE, POC: NORMAL
PH, POC: 6
PROTEIN, POC: NORMAL
SPECIFIC GRAVITY, POC: 1.01
UROBILINOGEN, POC: 0.2

## 2023-09-26 PROCEDURE — 99214 OFFICE O/P EST MOD 30 MIN: CPT | Performed by: NURSE PRACTITIONER

## 2023-09-26 PROCEDURE — 81002 URINALYSIS NONAUTO W/O SCOPE: CPT | Performed by: NURSE PRACTITIONER

## 2023-09-26 ASSESSMENT — ENCOUNTER SYMPTOMS
COUGH: 0
DIARRHEA: 0
ABDOMINAL PAIN: 0
SHORTNESS OF BREATH: 0
WHEEZING: 0
CONSTIPATION: 0
SINUS PAIN: 0
COLOR CHANGE: 0
SINUS PRESSURE: 0
BACK PAIN: 0

## 2023-09-26 NOTE — ASSESSMENT & PLAN NOTE
Trace blood on UA-otherwise unremarkable  We will obtain sexual health PCR  Increase fluids  Avoid bladder irritants such as alcohol and caffeine  Follow-up in 2 weeks for repeat UA-if positive for blood consider urology referral for further work-up

## 2023-09-26 NOTE — PROGRESS NOTES
Ericka Brock (:  1970) is a 47 y.o. female,Established patient, here for evaluation of the following chief complaint(s):  Urinary Tract Infection (Sx present for ~1 week, bleeding)      ASSESSMENT/PLAN:  1. Dysuria  -     POCT Urinalysis no Micro  -     Sexual Health Organism ID by PCR; Future  2. Gross hematuria  Assessment & Plan:  Trace blood on UA-otherwise unremarkable  We will obtain sexual health PCR  Increase fluids  Avoid bladder irritants such as alcohol and caffeine  Follow-up in 2 weeks for repeat UA-if positive for blood consider urology referral for further work-up      Return in about 2 weeks (around 10/10/2023). SUBJECTIVE/OBJECTIVE:  HPI  Here for concern of blood in her urine that she noticed about 4 days ago. She notices with wiping for about 2 days. Denies any vaginal drainage. She does note some vaginal irritation that is mild. She denies dysuria, urgency, frequency. No flank pain or abdominal pain. She has not noticed any hematuria since 4 days ago. Current Outpatient Medications   Medication Sig Dispense Refill    Semaglutide-Weight Management (WEGOVY) 0.25 MG/0.5ML SOAJ SC injection Inject 0.25 mg into the skin every 7 days for 4 doses 2 mL 0    levothyroxine (SYNTHROID) 50 MCG tablet TAKE 1 TABLET BY MOUTH EVERY DAY 90 tablet 0    triamterene-hydroCHLOROthiazide (MAXZIDE-25) 37.5-25 MG per tablet Take 1 tablet by mouth daily 90 tablet 3    diclofenac (VOLTAREN) 75 MG EC tablet Take 1 tablet by mouth 2 times daily 60 tablet 5    vitamin B-12 (CYANOCOBALAMIN) 1000 MCG tablet Take 1 tablet by mouth every other day      cetirizine (ZYRTEC) 10 MG tablet Take 1 tablet by mouth daily      magnesium 30 MG tablet Take 1 tablet by mouth 2 times daily       No current facility-administered medications for this visit. Review of Systems   Constitutional:  Negative for chills, fatigue and fever. HENT:  Negative for congestion, sinus pressure and sinus pain.     Respiratory:

## 2023-09-28 LAB
C TRACH DNA SPEC QL NAA+PROBE: NOT DETECTED
CANDIDA RRNA VAG QL PROBE: NOT DETECTED
CANDIDA RRNA VAG QL PROBE: NOT DETECTED
CARBAPENEM RESISTANCE OXA-48 GENE BY PCR: NOT DETECTED
CEPHALOSPORIN RESISTANCE AMPC GENE: NOT DETECTED
ESBL RESISTANCE: NOT DETECTED
G VAGINALIS RRNA GENITAL QL PROBE: ABNORMAL
M HOMINIS DNA BLD QL NAA+PROBE: NOT DETECTED
MACROLIDE RESISTANCE: ABNORMAL
METHICILLIN RESISTANCE: NOT DETECTED
MYCOPLASMA DNA SPEC QL NAA+PROBE: NOT DETECTED
N GONORRHOEA DNA SPEC QL NAA+PROBE: NOT DETECTED
OTHER MICROORG DNA SPEC QL NAA+PROBE: NOT DETECTED
OTHER MICROORG DNA SPEC QL NAA+PROBE: NOT DETECTED
QUINOLONE AND FLUOROQUINOLONE RESISTANCE: NOT DETECTED
T VAGINALIS RRNA SPEC QL NAA+PROBE: NOT DETECTED
TETRACYCLINE RESISTANCE: ABNORMAL
TRIMETHOPRIM/SULFONAMIDE RESISTANCE: NOT DETECTED

## 2023-10-09 ENCOUNTER — OFFICE VISIT (OUTPATIENT)
Dept: FAMILY MEDICINE CLINIC | Age: 53
End: 2023-10-09
Payer: COMMERCIAL

## 2023-10-09 VITALS
TEMPERATURE: 96.9 F | SYSTOLIC BLOOD PRESSURE: 120 MMHG | BODY MASS INDEX: 27.06 KG/M2 | HEART RATE: 70 BPM | DIASTOLIC BLOOD PRESSURE: 70 MMHG | OXYGEN SATURATION: 95 % | WEIGHT: 178 LBS

## 2023-10-09 DIAGNOSIS — R60.0 PEDAL EDEMA: ICD-10-CM

## 2023-10-09 DIAGNOSIS — R31.1 BENIGN ESSENTIAL MICROSCOPIC HEMATURIA: Primary | ICD-10-CM

## 2023-10-09 DIAGNOSIS — E66.09 CLASS 1 OBESITY DUE TO EXCESS CALORIES WITHOUT SERIOUS COMORBIDITY WITH BODY MASS INDEX (BMI) OF 33.0 TO 33.9 IN ADULT: ICD-10-CM

## 2023-10-09 DIAGNOSIS — M17.0 PRIMARY OSTEOARTHRITIS OF BOTH KNEES: ICD-10-CM

## 2023-10-09 DIAGNOSIS — E03.9 ACQUIRED HYPOTHYROIDISM: ICD-10-CM

## 2023-10-09 LAB
BILIRUBIN, POC: NORMAL
BLOOD URINE, POC: NORMAL
CLARITY, POC: CLEAR
COLOR, POC: YELLOW
GLUCOSE URINE, POC: NORMAL
KETONES, POC: NORMAL
LEUKOCYTE EST, POC: NORMAL
NITRITE, POC: NORMAL
PH, POC: 7
PROTEIN, POC: NORMAL
SPECIFIC GRAVITY, POC: 1.01
UROBILINOGEN, POC: 0.2

## 2023-10-09 PROCEDURE — 90471 IMMUNIZATION ADMIN: CPT | Performed by: NURSE PRACTITIONER

## 2023-10-09 PROCEDURE — 81002 URINALYSIS NONAUTO W/O SCOPE: CPT | Performed by: NURSE PRACTITIONER

## 2023-10-09 PROCEDURE — 90674 CCIIV4 VAC NO PRSV 0.5 ML IM: CPT | Performed by: NURSE PRACTITIONER

## 2023-10-09 PROCEDURE — 99214 OFFICE O/P EST MOD 30 MIN: CPT | Performed by: NURSE PRACTITIONER

## 2023-10-09 RX ORDER — SEMAGLUTIDE 0.25 MG/.5ML
0.25 INJECTION, SOLUTION SUBCUTANEOUS
Qty: 2 ML | Refills: 0 | Status: SHIPPED | OUTPATIENT
Start: 2023-10-09 | End: 2023-10-31

## 2023-10-09 RX ORDER — LEVOTHYROXINE SODIUM 0.05 MG/1
50 TABLET ORAL DAILY
Qty: 90 TABLET | Refills: 0 | Status: SHIPPED | OUTPATIENT
Start: 2023-10-09

## 2023-10-09 RX ORDER — TRIAMTERENE AND HYDROCHLOROTHIAZIDE 37.5; 25 MG/1; MG/1
1 TABLET ORAL DAILY
Qty: 90 TABLET | Refills: 3 | Status: SHIPPED | OUTPATIENT
Start: 2023-10-09

## 2023-10-09 RX ORDER — DICLOFENAC SODIUM 75 MG/1
75 TABLET, DELAYED RELEASE ORAL 2 TIMES DAILY
Qty: 60 TABLET | Refills: 5 | Status: SHIPPED | OUTPATIENT
Start: 2023-10-09

## 2023-10-09 ASSESSMENT — ENCOUNTER SYMPTOMS
SORE THROAT: 0
COUGH: 0
DIARRHEA: 0
SHORTNESS OF BREATH: 0
VOMITING: 0
CONSTIPATION: 0
RHINORRHEA: 0
CHEST TIGHTNESS: 0
BACK PAIN: 0
COLOR CHANGE: 0
BLOOD IN STOOL: 0
ABDOMINAL PAIN: 0
EYE ITCHING: 0
SINUS PRESSURE: 0
EYE REDNESS: 0
WHEEZING: 0
NAUSEA: 0

## 2023-10-09 NOTE — PROGRESS NOTES
Conjunctiva/sclera: Conjunctivae normal.      Pupils: Pupils are equal, round, and reactive to light. Pulmonary:      Effort: Pulmonary effort is normal.   Musculoskeletal:         General: Normal range of motion. Cervical back: Normal range of motion. Skin:     Coloration: Skin is not jaundiced or pale. Findings: No bruising, erythema, lesion or rash. Neurological:      Mental Status: She is alert and oriented to person, place, and time. Mental status is at baseline. Psychiatric:         Mood and Affect: Mood normal.         Behavior: Behavior normal.         Thought Content: Thought content normal.         Judgment: Judgment normal.                 An electronic signature was used to authenticate this note.     --Benson Warren, CHAPARRO - CNP

## 2024-01-01 DIAGNOSIS — M17.0 PRIMARY OSTEOARTHRITIS OF BOTH KNEES: ICD-10-CM

## 2024-01-01 DIAGNOSIS — R60.0 PEDAL EDEMA: ICD-10-CM

## 2024-01-01 RX ORDER — DICLOFENAC SODIUM 75 MG/1
75 TABLET, DELAYED RELEASE ORAL 2 TIMES DAILY
Qty: 180 TABLET | Refills: 3 | Status: SHIPPED | OUTPATIENT
Start: 2024-01-01 | End: 2024-03-31

## 2024-01-01 RX ORDER — TRIAMTERENE AND HYDROCHLOROTHIAZIDE 37.5; 25 MG/1; MG/1
1 TABLET ORAL DAILY
Qty: 90 TABLET | Refills: 3 | Status: SHIPPED | OUTPATIENT
Start: 2024-01-01

## 2024-01-01 RX ORDER — LEVOTHYROXINE SODIUM 0.05 MG/1
50 TABLET ORAL DAILY
Qty: 90 TABLET | Refills: 3 | Status: SHIPPED | OUTPATIENT
Start: 2024-01-01

## 2024-01-19 ENCOUNTER — PATIENT MESSAGE (OUTPATIENT)
Dept: FAMILY MEDICINE CLINIC | Age: 54
End: 2024-01-19

## 2024-01-19 NOTE — TELEPHONE ENCOUNTER
From: Sharyn Beck  To: Annalee Shin  Sent: 1/19/2024 11:56 AM EST  Subject: Need help with prior visit    Hello,    I was seen by you on 9/26/23 due to finding blood in my urine. My insurance, [employer], is denying the claim saying the procedures 58852 & 87481-59x2 & 54471-37 were “experimental”.     Both [facility] and [facility] billing said I needed to contact you and ask if you could send your treatment notes explaining why these procedures were needed.     If you could fax this information to [facility] at [phone] attn: coding department with this acct # [id number] I would greatly appreciate it. Thank you in advance your your time and attention to this incredibly tedious task.

## 2024-01-22 RX ORDER — TIRZEPATIDE 10 MG/.5ML
10 INJECTION, SOLUTION SUBCUTANEOUS WEEKLY
Qty: 2 ML | Refills: 3 | Status: SHIPPED | OUTPATIENT
Start: 2024-01-22

## 2024-03-22 ENCOUNTER — OFFICE VISIT (OUTPATIENT)
Dept: FAMILY MEDICINE CLINIC | Age: 54
End: 2024-03-22

## 2024-03-22 VITALS
TEMPERATURE: 98.4 F | WEIGHT: 166 LBS | DIASTOLIC BLOOD PRESSURE: 78 MMHG | OXYGEN SATURATION: 99 % | HEIGHT: 68 IN | BODY MASS INDEX: 25.16 KG/M2 | HEART RATE: 79 BPM | SYSTOLIC BLOOD PRESSURE: 122 MMHG

## 2024-03-22 DIAGNOSIS — N39.0 URINARY TRACT INFECTION WITH HEMATURIA, SITE UNSPECIFIED: Primary | ICD-10-CM

## 2024-03-22 DIAGNOSIS — R31.9 URINARY TRACT INFECTION WITH HEMATURIA, SITE UNSPECIFIED: Primary | ICD-10-CM

## 2024-03-22 PROBLEM — R30.0 DYSURIA: Status: ACTIVE | Noted: 2024-03-22

## 2024-03-22 LAB
BILIRUBIN, POC: NORMAL
BLOOD URINE, POC: NORMAL
CLARITY, POC: NORMAL
COLOR, POC: YELLOW
GLUCOSE URINE, POC: NORMAL
KETONES, POC: NORMAL
LEUKOCYTE EST, POC: NORMAL
NITRITE, POC: NORMAL
PH, POC: 7
PROTEIN, POC: NORMAL
SPECIFIC GRAVITY, POC: 1.01
UROBILINOGEN, POC: 0.2

## 2024-03-22 RX ORDER — NITROFURANTOIN 25; 75 MG/1; MG/1
100 CAPSULE ORAL 2 TIMES DAILY
Qty: 14 CAPSULE | Refills: 0 | Status: SHIPPED | OUTPATIENT
Start: 2024-03-22 | End: 2024-03-29

## 2024-03-22 RX ORDER — NITROFURANTOIN 25; 75 MG/1; MG/1
100 CAPSULE ORAL 2 TIMES DAILY
Qty: 20 CAPSULE | Refills: 0 | Status: SHIPPED | OUTPATIENT
Start: 2024-03-22 | End: 2024-03-22

## 2024-03-22 SDOH — ECONOMIC STABILITY: INCOME INSECURITY: HOW HARD IS IT FOR YOU TO PAY FOR THE VERY BASICS LIKE FOOD, HOUSING, MEDICAL CARE, AND HEATING?: NOT HARD AT ALL

## 2024-03-22 SDOH — ECONOMIC STABILITY: FOOD INSECURITY: WITHIN THE PAST 12 MONTHS, YOU WORRIED THAT YOUR FOOD WOULD RUN OUT BEFORE YOU GOT MONEY TO BUY MORE.: NEVER TRUE

## 2024-03-22 SDOH — ECONOMIC STABILITY: FOOD INSECURITY: WITHIN THE PAST 12 MONTHS, THE FOOD YOU BOUGHT JUST DIDN'T LAST AND YOU DIDN'T HAVE MONEY TO GET MORE.: NEVER TRUE

## 2024-03-22 ASSESSMENT — ENCOUNTER SYMPTOMS
WHEEZING: 0
ABDOMINAL PAIN: 0
SINUS PAIN: 0
SHORTNESS OF BREATH: 0
CONSTIPATION: 0
COLOR CHANGE: 0
COUGH: 0
BACK PAIN: 0
DIARRHEA: 0
SINUS PRESSURE: 0

## 2024-03-22 ASSESSMENT — PATIENT HEALTH QUESTIONNAIRE - PHQ9
SUM OF ALL RESPONSES TO PHQ9 QUESTIONS 1 & 2: 0
DEPRESSION UNABLE TO ASSESS: FUNCTIONAL CAPACITY MOTIVATION LIMITS ACCURACY
SUM OF ALL RESPONSES TO PHQ QUESTIONS 1-9: 0
2. FEELING DOWN, DEPRESSED OR HOPELESS: NOT AT ALL
SUM OF ALL RESPONSES TO PHQ QUESTIONS 1-9: 0
1. LITTLE INTEREST OR PLEASURE IN DOING THINGS: NOT AT ALL
SUM OF ALL RESPONSES TO PHQ QUESTIONS 1-9: 0
SUM OF ALL RESPONSES TO PHQ QUESTIONS 1-9: 0

## 2024-03-22 NOTE — PROGRESS NOTES
Sharyn Beck (:  1970) is a 53 y.o. female,Established patient, here for evaluation of the following chief complaint(s):  Urinary Tract Infection (Sx present for ~3-4 days, cloudy urine, lower abdominal pain, urgency)      ASSESSMENT/PLAN:  1. Urinary tract infection with hematuria, site unspecified  Assessment & Plan:   Take antibiotic as prescribed for full course.   Urine cx sent.   Increase fluid intake.   Will consider estradiol vaginal cream if UTIs become more frequent.   Follow up if develop high fevers, flank pain, or worsening or persistent symptoms.     Orders:  -     POCT Urinalysis no Micro  -     Culture, Urine      No follow-ups on file.    SUBJECTIVE/OBJECTIVE:  Presents today with complaints of dysuria for several days. Reports pain with urination, increased frequency, and suprapubic pain. States she developed cold symptoms on 3/14 and felt feverish this past Wednesday night, but cold symptoms have since resolved. Denies current fever and flank pain. Reports having a UTI in 2024.     Urinary Tract Infection  This is a new problem. The current episode started in the past 7 days. The problem has been gradually worsening since onset. Associated symptoms include pain. Pertinent negatives include no hematuria. The pain is present in the suprapubic region.       Current Outpatient Medications   Medication Sig Dispense Refill    nitrofurantoin, macrocrystal-monohydrate, (MACROBID) 100 MG capsule Take 1 capsule by mouth 2 times daily for 7 days 14 capsule 0    Tirzepatide-Weight Management (ZEPBOUND) 10 MG/0.5ML SOAJ Inject 10 mg into the skin once a week 2 mL 3    diclofenac (VOLTAREN) 75 MG EC tablet Take 1 tablet by mouth 2 times daily 180 tablet 3    levothyroxine (SYNTHROID) 50 MCG tablet Take 1 tablet by mouth daily 90 tablet 3    triamterene-hydroCHLOROthiazide (MAXZIDE-25) 37.5-25 MG per tablet Take 1 tablet by mouth daily 90 tablet 3    vitamin B-12 (CYANOCOBALAMIN) 1000 MCG

## 2024-03-22 NOTE — ASSESSMENT & PLAN NOTE
Take antibiotic as prescribed for full course.   Urine cx sent.   Increase fluid intake.   Will consider estradiol vaginal cream if UTIs become more frequent.   Follow up if develop high fevers, flank pain, or worsening or persistent symptoms.

## 2024-03-24 LAB
BACTERIA UR CULT: ABNORMAL
ORGANISM: ABNORMAL

## 2024-03-25 LAB
BACTERIA UR CULT: ABNORMAL
ORGANISM: ABNORMAL

## 2024-05-16 ENCOUNTER — TELEPHONE (OUTPATIENT)
Dept: FAMILY MEDICINE CLINIC | Age: 54
End: 2024-05-16

## 2024-05-16 NOTE — TELEPHONE ENCOUNTER
LMOM to schedule appt request to discuss hormones and thyroid hormones test and want to talk about HRT

## 2024-05-22 ENCOUNTER — OFFICE VISIT (OUTPATIENT)
Dept: FAMILY MEDICINE CLINIC | Age: 54
End: 2024-05-22
Payer: COMMERCIAL

## 2024-05-22 VITALS
HEART RATE: 65 BPM | BODY MASS INDEX: 25.88 KG/M2 | TEMPERATURE: 97.2 F | WEIGHT: 170.2 LBS | OXYGEN SATURATION: 98 % | DIASTOLIC BLOOD PRESSURE: 64 MMHG | RESPIRATION RATE: 14 BRPM | SYSTOLIC BLOOD PRESSURE: 106 MMHG

## 2024-05-22 DIAGNOSIS — E03.9 ACQUIRED HYPOTHYROIDISM: ICD-10-CM

## 2024-05-22 DIAGNOSIS — Z78.0 POSTMENOPAUSAL ESTROGEN DEFICIENCY: ICD-10-CM

## 2024-05-22 DIAGNOSIS — Z00.00 ROUTINE GENERAL MEDICAL EXAMINATION AT A HEALTH CARE FACILITY: Primary | ICD-10-CM

## 2024-05-22 PROCEDURE — 99214 OFFICE O/P EST MOD 30 MIN: CPT | Performed by: NURSE PRACTITIONER

## 2024-05-22 PROCEDURE — G2211 COMPLEX E/M VISIT ADD ON: HCPCS | Performed by: NURSE PRACTITIONER

## 2024-05-22 ASSESSMENT — ENCOUNTER SYMPTOMS
DIARRHEA: 0
BACK PAIN: 0
VOMITING: 0
RHINORRHEA: 0
EYE ITCHING: 0
CONSTIPATION: 0
CHEST TIGHTNESS: 0
EYE REDNESS: 0
BLOOD IN STOOL: 0
SORE THROAT: 0
SINUS PRESSURE: 0
ABDOMINAL PAIN: 0
SHORTNESS OF BREATH: 0
COLOR CHANGE: 0
COUGH: 0
NAUSEA: 0
WHEEZING: 0

## 2024-05-22 NOTE — ASSESSMENT & PLAN NOTE
The is chronic problem that is not controlled or stable at this time. She states that her symptoms have become bothersome enough for her to seek treatment. She has no prior history of personal or first degree relative with breast or endometrial CA. 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 She is interested in hormone replacement. She is due for additional labs and PE. We discussed treatment options and expectations, she is in agreement to get additional labs and to discuss hormone replacement at that visit.  I have provided her with veozah sample today

## 2024-05-22 NOTE — PROGRESS NOTES
Sharyn Beck (:  1970) is a 53 y.o. female,Established patient, here for evaluation of the following chief complaint(s):  Consultation (Discuss HRT) and Discuss Labs      ASSESSMENT/PLAN:  1. Routine general medical examination at a health care facility  -     CBC; Future  -     Comprehensive Metabolic Panel; Future  -     Lipid, Fasting; Future  2. Postmenopausal estrogen deficiency  Assessment & Plan:   The is chronic problem that is not controlled or stable at this time. She states that her symptoms have become bothersome enough for her to seek treatment. She has no prior history of personal or first degree relative with breast or endometrial CA. 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 She is interested in hormone replacement. She is due for additional labs and PE. We discussed treatment options and expectations, she is in agreement to get additional labs and to discuss hormone replacement at that visit.  I have provided her with veozah sample today    3. Acquired hypothyroidism  Assessment & Plan:   Is a chronic problem that is controlled and stable on current treatment plan.  Patient recently thyroid labs have been [facility].  At this time continue levothyroxine 50 mcg without changes as she is experiencing benefit without side effects.       No follow-ups on file.  [facility].  SUBJECTIVE/OBJECTIVE:    Patient is in the office today to discuss HRT and menopause. She states that she has been post menopausal for about 2 years. Her symptoms include fatigue, insomnia, weight gain and some hot flashes. She did go to [facility] and had [place] testing completed. She attached these labs in her mychart and available under media tap to review.   Current Outpatient Medications   Medication Sig Dispense Refill    Tirzepatide-Weight Management (ZEPBOUND) 10 MG/0.5ML SOAJ Inject 10 mg into the skin once a week 2 mL 3

## 2024-05-22 NOTE — ASSESSMENT & PLAN NOTE
Is a chronic problem that is controlled and stable on current treatment plan.  Patient recently [facility]  At this time continue levothyroxine 50 mcg without changes as she is experiencing benefit without side effects.

## 2024-06-11 ENCOUNTER — OFFICE VISIT (OUTPATIENT)
Dept: FAMILY MEDICINE CLINIC | Age: 54
End: 2024-06-11
Payer: COMMERCIAL

## 2024-06-11 VITALS
WEIGHT: 173 LBS | HEART RATE: 68 BPM | TEMPERATURE: 96.9 F | BODY MASS INDEX: 26.3 KG/M2 | OXYGEN SATURATION: 98 % | SYSTOLIC BLOOD PRESSURE: 124 MMHG | DIASTOLIC BLOOD PRESSURE: 82 MMHG

## 2024-06-11 DIAGNOSIS — Z00.00 ROUTINE GENERAL MEDICAL EXAMINATION AT A HEALTH CARE FACILITY: Primary | ICD-10-CM

## 2024-06-11 DIAGNOSIS — R73.03 PREDIABETES: ICD-10-CM

## 2024-06-11 DIAGNOSIS — Z78.0 POSTMENOPAUSAL ESTROGEN DEFICIENCY: ICD-10-CM

## 2024-06-11 PROBLEM — R31.9 URINARY TRACT INFECTION WITH HEMATURIA: Status: RESOLVED | Noted: 2024-03-22 | Resolved: 2024-06-11

## 2024-06-11 PROBLEM — N83.201 RIGHT OVARIAN CYST: Status: RESOLVED | Noted: 2020-01-26 | Resolved: 2024-06-11

## 2024-06-11 PROBLEM — N39.0 URINARY TRACT INFECTION WITH HEMATURIA: Status: RESOLVED | Noted: 2024-03-22 | Resolved: 2024-06-11

## 2024-06-11 PROBLEM — R39.15 URINARY URGENCY: Status: RESOLVED | Noted: 2023-04-17 | Resolved: 2024-06-11

## 2024-06-11 PROCEDURE — 99396 PREV VISIT EST AGE 40-64: CPT | Performed by: NURSE PRACTITIONER

## 2024-06-11 RX ORDER — AZITHROMYCIN 250 MG/1
TABLET, FILM COATED ORAL
Qty: 6 TABLET | Refills: 0 | Status: SHIPPED | OUTPATIENT
Start: 2024-06-11 | End: 2024-06-21

## 2024-06-11 RX ORDER — ESTROGEN,CON/M-PROGEST ACET 0.3-1.5MG
1 TABLET ORAL DAILY
Qty: 28 TABLET | Refills: 3 | Status: SHIPPED | OUTPATIENT
Start: 2024-06-11

## 2024-06-11 ASSESSMENT — ENCOUNTER SYMPTOMS
EYE ITCHING: 0
DIARRHEA: 0
WHEEZING: 0
SHORTNESS OF BREATH: 0
RHINORRHEA: 0
VOMITING: 0
CONSTIPATION: 0
CHEST TIGHTNESS: 0
BLOOD IN STOOL: 0
COUGH: 0
BACK PAIN: 0
ABDOMINAL PAIN: 0
SINUS PRESSURE: 0
COLOR CHANGE: 0
NAUSEA: 0
EYE REDNESS: 0
SORE THROAT: 0

## 2024-06-11 NOTE — PROGRESS NOTES
magnesium 30 MG tablet Take 1 tablet by mouth 2 times daily      diclofenac (VOLTAREN) 75 MG EC tablet Take 1 tablet by mouth 2 times daily 180 tablet 3    triamterene-hydroCHLOROthiazide (MAXZIDE-25) 37.5-25 MG per tablet Take 1 tablet by mouth daily (Patient not taking: Reported on 6/11/2024) 90 tablet 3     No current facility-administered medications for this visit.         Review of Systems   Constitutional:  Negative for chills, fatigue and fever.   HENT:  Negative for congestion, ear pain, postnasal drip, rhinorrhea, sinus pressure, sneezing and sore throat.    Eyes:  Negative for redness and itching.   Respiratory:  Negative for cough, chest tightness, shortness of breath and wheezing.    Cardiovascular:  Negative for chest pain and palpitations.   Gastrointestinal:  Negative for abdominal pain, blood in stool, constipation, diarrhea, nausea and vomiting.   Endocrine: Negative for cold intolerance and heat intolerance.   Genitourinary:  Negative for difficulty urinating, dysuria, flank pain, frequency, hematuria and urgency.   Musculoskeletal:  Negative for arthralgias, back pain, joint swelling and myalgias.   Skin:  Negative for color change, pallor, rash and wound.   Allergic/Immunologic: Negative for environmental allergies and food allergies.   Neurological:  Negative for dizziness, seizures, syncope, weakness, light-headedness, numbness and headaches.   Hematological:  Negative for adenopathy. Does not bruise/bleed easily.   Psychiatric/Behavioral:  Negative for confusion, sleep disturbance and suicidal ideas. The patient is not nervous/anxious and is not hyperactive.        Vitals:    06/11/24 1331   BP: 124/82   Pulse: 68   Temp: 96.9 °F (36.1 °C)   SpO2: 98%   Weight: 78.5 kg (173 lb)       Physical Exam  Constitutional:       Appearance: Normal appearance. She is normal weight.   HENT:      Head: Normocephalic and atraumatic.      Right Ear: Tympanic membrane, ear canal and external ear normal.

## 2024-06-11 NOTE — ASSESSMENT & PLAN NOTE
Well exam in office fasting labs were completed today waiting on the results.  Health maintenance has been reviewed.

## 2024-06-11 NOTE — ASSESSMENT & PLAN NOTE
Patient qualifies for hormone replacement therapy she would like to move forward this.  We have ordered Prempro for her and she will continue with use of veozah

## 2024-06-11 NOTE — ASSESSMENT & PLAN NOTE
Labs have been ordered we will evaluate and see if treatment is indicated.  At this time this is a chronic condition unsure of stability.

## 2024-06-12 ENCOUNTER — PATIENT MESSAGE (OUTPATIENT)
Dept: FAMILY MEDICINE CLINIC | Age: 54
End: 2024-06-12

## 2024-06-12 DIAGNOSIS — E78.2 MIXED HYPERLIPIDEMIA: Primary | ICD-10-CM

## 2024-06-12 NOTE — TELEPHONE ENCOUNTER
From: Sharyn Beck  To: Umu Bradshaw  Sent: 6/12/2024 2:54 PM EDT  Subject: Blood test    Did we do an A1C level test? And given my high cholesterol should I get the cardiac calcium test?

## 2024-06-21 ENCOUNTER — PATIENT MESSAGE (OUTPATIENT)
Dept: FAMILY MEDICINE CLINIC | Age: 54
End: 2024-06-21

## 2024-06-23 RX ORDER — ESTRADIOL 0.03 MG/D
1 FILM, EXTENDED RELEASE TRANSDERMAL WEEKLY
Qty: 8 PATCH | Refills: 3 | Status: SHIPPED | OUTPATIENT
Start: 2024-06-23

## 2024-06-23 RX ORDER — PROGESTERONE 100 MG/1
100 CAPSULE ORAL NIGHTLY
Qty: 90 CAPSULE | Refills: 0 | Status: SHIPPED | OUTPATIENT
Start: 2024-06-23

## 2024-06-28 ENCOUNTER — HOSPITAL ENCOUNTER (OUTPATIENT)
Dept: CT IMAGING | Age: 54
Discharge: HOME | End: 2024-06-28
Payer: COMMERCIAL

## 2024-06-28 DIAGNOSIS — E78.2 MIXED HYPERLIPIDEMIA: ICD-10-CM

## 2024-06-28 DIAGNOSIS — E03.9 HYPOTHYROIDISM, UNSPECIFIED TYPE: Primary | ICD-10-CM

## 2024-06-28 PROCEDURE — 75571 CT HRT W/O DYE W/CA TEST: CPT

## 2024-06-28 NOTE — TELEPHONE ENCOUNTER
Requested Prescriptions     Pending Prescriptions Disp Refills    levothyroxine (SYNTHROID) 50 MCG tablet 90 tablet 3     Sig: Take 1 tablet by mouth daily          Last Office Visit: 6/11/2024     Next Office Visit: Visit date not found

## 2024-07-03 RX ORDER — LEVOTHYROXINE SODIUM 50 UG/1
50 TABLET ORAL DAILY
Qty: 30 TABLET | Refills: 1 | Status: SHIPPED | OUTPATIENT
Start: 2024-07-03 | End: 2024-07-03 | Stop reason: SDUPTHER

## 2024-07-03 RX ORDER — LEVOTHYROXINE SODIUM 50 UG/1
50 TABLET ORAL DAILY
Qty: 90 TABLET | Refills: 1 | Status: SHIPPED | OUTPATIENT
Start: 2024-07-03

## 2024-07-03 NOTE — TELEPHONE ENCOUNTER
Pt stated she had some thyroid labs completed 4-19-24 (scanned in media tab).     Does she still need labs? Please advise.

## 2024-07-11 PROBLEM — Z00.00 ROUTINE GENERAL MEDICAL EXAMINATION AT A HEALTH CARE FACILITY: Status: RESOLVED | Noted: 2024-06-11 | Resolved: 2024-07-11

## 2024-07-15 NOTE — TELEPHONE ENCOUNTER
Please see attached [facility] Referral Approval for Marj Ricks MD, [facility] 5/28/2021-5/28/2022. no

## 2024-07-22 RX ORDER — ESTRADIOL 0.04 MG/D
1 PATCH, EXTENDED RELEASE TRANSDERMAL
Qty: 8 PATCH | Refills: 3 | Status: SHIPPED | OUTPATIENT
Start: 2024-07-22

## 2024-08-12 RX ORDER — PROGESTERONE 100 MG/1
100 CAPSULE ORAL NIGHTLY
Qty: 90 CAPSULE | Refills: 0 | Status: SHIPPED | OUTPATIENT
Start: 2024-08-12

## 2024-08-12 RX ORDER — ESTRADIOL 0.04 MG/D
PATCH, EXTENDED RELEASE TRANSDERMAL
Qty: 24 PATCH | Refills: 0 | Status: SHIPPED | OUTPATIENT
Start: 2024-08-12

## 2024-08-25 ENCOUNTER — PATIENT MESSAGE (OUTPATIENT)
Dept: FAMILY MEDICINE CLINIC | Age: 54
End: 2024-08-25

## 2024-10-01 NOTE — TELEPHONE ENCOUNTER
From: Rosemarie Townsend  To: Dorothy Jimenez MD  Sent: 1/29/2018 9:37 AM EST  Subject: Prescription Question    I need a refill for my adderall. Is that something you can send to [facility]. or do I need to come in and  a paper script?     Thank you,  Shahzad Canela 06:00

## 2024-10-19 ENCOUNTER — PATIENT MESSAGE (OUTPATIENT)
Dept: FAMILY MEDICINE CLINIC | Age: 54
End: 2024-10-19

## 2024-10-28 RX ORDER — PROGESTERONE 100 MG/1
100 CAPSULE ORAL NIGHTLY
Qty: 90 CAPSULE | Refills: 0 | Status: SHIPPED | OUTPATIENT
Start: 2024-10-28 | End: 2024-11-01

## 2024-10-28 RX ORDER — ESTRADIOL 0.04 MG/D
PATCH, EXTENDED RELEASE TRANSDERMAL
Qty: 24 PATCH | Refills: 0 | Status: SHIPPED | OUTPATIENT
Start: 2024-10-28 | End: 2024-11-01

## 2024-10-29 RX ORDER — LEVOTHYROXINE SODIUM 50 UG/1
50 TABLET ORAL DAILY
Qty: 90 TABLET | Refills: 1 | Status: SHIPPED | OUTPATIENT
Start: 2024-10-29

## 2024-11-01 ENCOUNTER — OFFICE VISIT (OUTPATIENT)
Dept: GYNECOLOGY | Age: 54
End: 2024-11-01
Payer: COMMERCIAL

## 2024-11-01 VITALS — BODY MASS INDEX: 26.46 KG/M2 | OXYGEN SATURATION: 97 % | WEIGHT: 174 LBS | HEART RATE: 71 BPM

## 2024-11-01 DIAGNOSIS — N95.1 MENOPAUSAL HOT FLUSHES: ICD-10-CM

## 2024-11-01 DIAGNOSIS — Z01.419 WELL WOMAN EXAM WITH ROUTINE GYNECOLOGICAL EXAM: Primary | ICD-10-CM

## 2024-11-01 PROCEDURE — 99386 PREV VISIT NEW AGE 40-64: CPT | Performed by: OBSTETRICS & GYNECOLOGY

## 2024-11-01 RX ORDER — ESTRADIOL 0.1 MG/D
1 FILM, EXTENDED RELEASE TRANSDERMAL
Qty: 24 PATCH | Refills: 3 | Status: SHIPPED | OUTPATIENT
Start: 2024-11-04

## 2024-11-01 RX ORDER — PROGESTERONE 200 MG/1
200 CAPSULE ORAL NIGHTLY
Qty: 90 CAPSULE | Refills: 3 | Status: SHIPPED | OUTPATIENT
Start: 2024-11-01

## 2024-11-01 NOTE — PROGRESS NOTES
The sensitive parts of the examination were performed with Desi Cook MA as a chaperone.  Desi was present during the entirety of the sensitive parts of the examination.  
morbidly obese    Colon Cancer Paternal Grandmother 85    COPD Paternal Grandfather     Heart Attack Maternal Uncle 48        sudden cardiac death, healthy    Arthritis Mother     Alcohol Abuse Brother     Alcohol Abuse Sister        OBJECTIVE:  Pulse 71   Wt 78.9 kg (174 lb)   LMP  (LMP Unknown)   SpO2 97%   BMI 26.46 kg/m²   Body mass index is 26.46 kg/m².  General appearance: alert,calm,pleasant, no acute distress, non-toxic  Skin:  no lesions,no rashes  Neck: no thyromegaly,no adenopathy,no masses  Abdominal: Abdomen soft, non-tender. No rebound or guarding. No masses, organomegaly  Breasts: Declined  Genitourinary:  Vulva:  no external lesions,normal hair distribution,no inguinal adenopathy  Vagina:  pink, atrophic changes,no discharge  Bladder without mass, nontender.  Urethra, and Urethral meatus grossly normal without mass and nontender  Cervix:  smooth,pink,no lesions.  Uterus:  normal size, shape and consistency,mobile,nontender  Adnexa:  no masses,no tenderness  Rectum/anus:  no external hemorrhoids,no lesions  History and Examination chaperoned by Medical Assistant    ASSESSMENT AND PLAN:   Diagnosis Orders   1. Well woman exam with routine gynecological exam        2. Menopausal hot flushes  progesterone (PROMETRIUM) 200 MG CAPS capsule    estradiol (VIVELLE-DOT) 0.1 MG/24HR        Complains of inadequate control of menopausal symptoms with current hormone therapy regimen.  Interested in alternatives and dose adjustment.  Discussed at length.  Changed to Vivelle-Dot 0.1 mg per 24 hours and Prometrium 200 mg nightly.  Risks and benefits of HRT reviewed and risk acceptable.  No contraindications.    SBE monthly and return annually or prn  Explained current pap smear and mammogram guidelines  Patient counseling:  HRT

## 2024-11-06 LAB
HPV HR 12 DNA SPEC QL NAA+PROBE: NOT DETECTED
HPV16 DNA SPEC QL NAA+PROBE: NOT DETECTED
HPV16+18+H RISK 12 DNA SPEC-IMP: NORMAL
HPV18 DNA SPEC QL NAA+PROBE: NOT DETECTED

## 2024-12-11 ENCOUNTER — PATIENT MESSAGE (OUTPATIENT)
Dept: GYNECOLOGY | Age: 54
End: 2024-12-11

## 2024-12-11 DIAGNOSIS — N95.0 PMB (POSTMENOPAUSAL BLEEDING): Primary | ICD-10-CM

## 2024-12-13 ENCOUNTER — TELEMEDICINE (OUTPATIENT)
Dept: FAMILY MEDICINE CLINIC | Age: 54
End: 2024-12-13
Payer: COMMERCIAL

## 2024-12-13 DIAGNOSIS — H00.14 CHALAZION LEFT UPPER EYELID: Primary | ICD-10-CM

## 2024-12-13 PROCEDURE — 99213 OFFICE O/P EST LOW 20 MIN: CPT | Performed by: NURSE PRACTITIONER

## 2024-12-13 ASSESSMENT — ENCOUNTER SYMPTOMS
COLOR CHANGE: 0
DIARRHEA: 0
COUGH: 0
CONSTIPATION: 0
BACK PAIN: 0
WHEEZING: 0
SINUS PAIN: 0
SHORTNESS OF BREATH: 0
ABDOMINAL PAIN: 0
SINUS PRESSURE: 0

## 2024-12-13 NOTE — ASSESSMENT & PLAN NOTE
Acute condition, new, continue warm compresses.  Reassurance provided that these are usually self-limiting.  If no improvement after another 1 to 2 weeks, with follow-up with ophthalmology.  Provided with [facility] information

## 2024-12-13 NOTE — PROGRESS NOTES
Sharyn Beck, was evaluated through a synchronous (real-time) audio-video encounter. The patient (or guardian if applicable) is aware that this is a billable service, which includes applicable co-pays. This Virtual Visit was conducted with patient's (and/or legal guardian's) consent. Patient identification was verified, and a caregiver was present when appropriate.   The patient was located at Home: [address] [place] [id number]  Provider was located at Facility (Appt Dept): [address] [place] [address] [address]  [place] [phone]  Confirm you are appropriately licensed, registered, or certified to deliver care in the state where the patient is located as indicated above. If you are not or unsure, please re-schedule the visit: Yes, I confirm.     Sharyn Beck (:  1970) is a Established patient, presenting virtually for evaluation of the following:      Below is the assessment and plan developed based on review of pertinent history, physical exam, labs, studies, and medications.     Assessment & Plan  Chalazion left upper eyelid   Acute condition, new, continue warm compresses.  Reassurance provided that these are usually self-limiting.  If no improvement after another 1 to 2 weeks, with follow-up with ophthalmology.  Provided with [facility] information           No follow-ups on file.       Subjective   HPI  Here for concern of stye of left eye that has been present for the last week.  Denies any redness, swelling, drainage.  No pain.  She has been doing warm compresses without relief.  No eye pain or vision changes    Review of Systems   Constitutional:  Negative for chills, fatigue and fever.   HENT:  Negative for congestion, sinus pressure and sinus pain.    Eyes:         Stye, left eye   Respiratory:  Negative for cough, shortness of breath and wheezing.    Cardiovascular:  Negative for chest pain and palpitations.   Gastrointestinal:  Negative for abdominal pain, constipation

## 2024-12-14 ENCOUNTER — HOSPITAL ENCOUNTER (OUTPATIENT)
Dept: ULTRASOUND IMAGING | Age: 54
Discharge: HOME | End: 2024-12-14
Attending: OBSTETRICS & GYNECOLOGY
Payer: COMMERCIAL

## 2024-12-14 DIAGNOSIS — N95.0 PMB (POSTMENOPAUSAL BLEEDING): ICD-10-CM

## 2024-12-14 PROCEDURE — 76830 TRANSVAGINAL US NON-OB: CPT

## 2024-12-17 ENCOUNTER — OFFICE VISIT (OUTPATIENT)
Dept: GYNECOLOGY | Age: 54
End: 2024-12-17

## 2024-12-17 VITALS
SYSTOLIC BLOOD PRESSURE: 124 MMHG | HEART RATE: 74 BPM | OXYGEN SATURATION: 98 % | WEIGHT: 180 LBS | BODY MASS INDEX: 27.37 KG/M2 | DIASTOLIC BLOOD PRESSURE: 80 MMHG

## 2024-12-17 DIAGNOSIS — N95.0 PMB (POSTMENOPAUSAL BLEEDING): Primary | ICD-10-CM

## 2024-12-17 DIAGNOSIS — R93.89 ENDOMETRIAL THICKENING ON ULTRASOUND: ICD-10-CM

## 2024-12-17 NOTE — PROGRESS NOTES
The sensitive parts of the examination were performed with Desi Cook MA as a chaperone.  Desi was present during the entirety of the sensitive parts of the examination.

## 2024-12-17 NOTE — PROGRESS NOTES
Sharyn Beck is advised to proceed with endometrial biopsy for further evaluation of her postmenopausal bleeding.  She is on HRT: E2 patch 0.1/P4 200    See previous notes.  Patient experienced some postmenopausal bleeding.  Her evaluation included a pelvic ultrasound which showed endometrial stripe of 5 mm with fluid.  The findings and clinical significance were reviewed with the patient.  Management options were discussed and she wants to proceed with endometrial sampling.    She is educated/made aware of the risks of procedure including, but not limited to bleeding, infection, uterine perforation, damage to internal organs, and need for surgery.  She was also informed that the specimen is sent to the pathologist and as aware of the risks including failure to diagnose, as well as significant pathology be identified at that time requiring additional evaluation and treatment.  She accepts these things in exchange for the procedure and wishes to proceed.  Consent is obtained.    Procedure Note:  The patient is placed in a supine position in stirrups.  A speculum was gently placed into the vagina.  The cervix is visualized.  The cervix is cleaned with Betadine.  An Allis clamp was used on the anterior lip of the cervix for stabilization and to draw the uterine cervix down in the vaginal canal:  NO   Gentle uterine dilation with Lance uterine dilators was performed:  NO   Suction Pipelle device was advanced through the cervix and endocervical canal into the endometrial cavity.    Endometrial cavity sounds to: 7 cm  Suction was applied and sampling of the endometrial cavity was performed in the 360 degree fashion.  Number of passes performed: 1   Quantity of tissue obtained: Scant   The procedure was concluded.  All instruments were removed from vagina including speculum.  The procedure was well tolerated and no complications were experienced.  Specimen sent to pathology in formalin.

## 2024-12-19 RX ORDER — MEDROXYPROGESTERONE ACETATE 10 MG
10 TABLET ORAL DAILY
Qty: 90 TABLET | Refills: 0 | Status: SHIPPED | OUTPATIENT
Start: 2024-12-19

## 2024-12-19 NOTE — RESULT ENCOUNTER NOTE
[phone] (home) [phone] (work)   Spoke with patient.  Reviewed results and clinical significance.  Management options were discussed.  All questions were answered.  Plan to discontinue Prometrium 200 and start Provera 10 mg daily x 90 days and then resume Prometrium 200 mg nightly thereafter.

## 2025-01-09 ENCOUNTER — PATIENT MESSAGE (OUTPATIENT)
Dept: GYNECOLOGY | Age: 55
End: 2025-01-09

## 2025-01-10 ENCOUNTER — TELEMEDICINE (OUTPATIENT)
Dept: GYNECOLOGY | Age: 55
End: 2025-01-10

## 2025-01-10 DIAGNOSIS — Z79.890 POST-MENOPAUSE ON HRT (HORMONE REPLACEMENT THERAPY): Primary | ICD-10-CM

## 2025-01-10 RX ORDER — ESTRADIOL 0.07 MG/D
1 FILM, EXTENDED RELEASE TRANSDERMAL
Qty: 24 PATCH | Refills: 3 | Status: SHIPPED | OUTPATIENT
Start: 2025-01-13

## 2025-01-10 NOTE — PROGRESS NOTES
Sharyn Beck, was evaluated through a synchronous (real-time) audio-video encounter. The patient (or guardian if applicable) is aware that this is a billable service, which includes applicable co-pays. This Virtual Visit was conducted with patient's (and/or legal guardian's) consent. Patient identification was verified, and a caregiver was present when appropriate.   The patient was located at in her parked car at [facility]  Provider was located at Facility (Appt Dept): [address] [place] [id number]  Confirm you are appropriately licensed, registered, or certified to deliver care in the state where the patient is located as indicated above. If you are not or unsure, please re-schedule the visit: Yes, I confirm.     Sharyn Beck (:  1970) is a Established patient, presenting virtually for evaluation of the following:      Below is the assessment and plan developed based on review of pertinent history, physical exam, labs, studies, and medications.     Assessment & Plan  Post-menopause on HRT (hormone replacement therapy)   Chronic, not at goal (unstable), changes made today: Decrease estradiol patch from 0.1 mg down to 0.075 mg  Increase Provera from 10 mg daily to 20 mg daily for the next 5 days for bleeding management.  Once Provera is completed, switch to Prometrium 200 mg nightly.  Orders:    estradiol (VIVELLE-DOT) 0.075 MG/24HR; Place 1 patch onto the skin Twice a Week      No follow-ups on file.       Subjective   See previous notes.  Patient reports that she has been still having some consistent bleeding even on the Provera 10 mg daily.  We discussed increasing to 20 mg daily for the next 5 days to see if this helps subside it.  We discussed that she could have some increased bleeding when switching back to 10 mg a day.  Ultimately when she completes the 90-day course of Provera, that she was switched to the Prometrium 200 mg daily.    She reports

## 2025-01-11 ENCOUNTER — PATIENT MESSAGE (OUTPATIENT)
Dept: GYNECOLOGY | Age: 55
End: 2025-01-11

## 2025-01-22 DIAGNOSIS — R93.89 ENDOMETRIAL THICKENING ON ULTRASOUND: ICD-10-CM

## 2025-01-22 DIAGNOSIS — N95.0 PMB (POSTMENOPAUSAL BLEEDING): ICD-10-CM

## 2025-01-22 RX ORDER — MEDROXYPROGESTERONE ACETATE 10 MG
10 TABLET ORAL DAILY
Qty: 90 TABLET | Refills: 0 | OUTPATIENT
Start: 2025-01-22

## 2025-02-10 DIAGNOSIS — R60.0 PEDAL EDEMA: ICD-10-CM

## 2025-02-10 RX ORDER — TRIAMTERENE AND HYDROCHLOROTHIAZIDE 37.5; 25 MG/1; MG/1
1 TABLET ORAL DAILY
Qty: 90 TABLET | Refills: 0 | Status: SHIPPED | OUTPATIENT
Start: 2025-02-10

## 2025-02-10 NOTE — TELEPHONE ENCOUNTER
Medication:   Requested Prescriptions     Pending Prescriptions Disp Refills    triamterene-hydroCHLOROthiazide (MAXZIDE-25) 37.5-25 MG per tablet [Pharmacy Med Name: TRIAMT/HCTZ  TAB 37.5-25] 90 tablet 3     Sig: TAKE 1 TABLET DAILY        Last Filled:      Patient Phone Number: [phone] (home) [phone] (work)    Last appt: 12/13/2024   Next appt: Visit date not found    Last OARRS:       11/24/2017     9:04 AM   RX Monitoring   Attestation The Prescription Monitoring Report for this patient was reviewed today.   Periodic Controlled Substance Monitoring Medication contract signed today.

## 2025-05-03 DIAGNOSIS — R60.0 PEDAL EDEMA: ICD-10-CM

## 2025-05-05 RX ORDER — TRIAMTERENE AND HYDROCHLOROTHIAZIDE 37.5; 25 MG/1; MG/1
1 TABLET ORAL DAILY
Qty: 90 TABLET | Refills: 0 | Status: SHIPPED | OUTPATIENT
Start: 2025-05-05

## 2025-05-05 NOTE — TELEPHONE ENCOUNTER
Medication:   Requested Prescriptions     Pending Prescriptions Disp Refills    triamterene-hydroCHLOROthiazide (MAXZIDE-25) 37.5-25 MG per tablet [Pharmacy Med Name: TRIAMT/HCTZ  TAB 37.5-25] 90 tablet 0     Sig: TAKE 1 TABLET DAILY        Last Filled:      Patient Phone Number: [phone] (home) [phone] (work)    Last appt: 12/13/2024   Next appt: Visit date not found    Last OARRS:       11/24/2017     9:04 AM   RX Monitoring   Attestation The Prescription Monitoring Report for this patient was reviewed today.   Periodic Controlled Substance Monitoring Medication contract signed today.

## 2025-06-16 RX ORDER — LEVOTHYROXINE SODIUM 50 MCG
50 TABLET ORAL DAILY
Qty: 90 TABLET | Refills: 0 | Status: SHIPPED | OUTPATIENT
Start: 2025-06-16

## 2025-06-16 NOTE — TELEPHONE ENCOUNTER
Medication:   Requested Prescriptions     Pending Prescriptions Disp Refills    SYNTHROID 50 MCG tablet [Pharmacy Med Name: SYNTHROID TAB 50MCG] 90 tablet 1     Sig: TAKE 1 TABLET DAILY       Last Filled:      Patient Phone Number: [phone] (home) [phone] (work)    Last appt: 12/13/2024   Next appt: Visit date not found    Last Thyroid: 7/3/24  Lab Results   Component Value Date/Time    TSH 2.22 02/25/2023 11:34 AM

## 2025-06-20 ENCOUNTER — OFFICE VISIT (OUTPATIENT)
Dept: GYNECOLOGY | Age: 55
End: 2025-06-20
Payer: COMMERCIAL

## 2025-06-20 VITALS
HEART RATE: 76 BPM | OXYGEN SATURATION: 100 % | BODY MASS INDEX: 25.24 KG/M2 | DIASTOLIC BLOOD PRESSURE: 70 MMHG | SYSTOLIC BLOOD PRESSURE: 104 MMHG | WEIGHT: 166 LBS

## 2025-06-20 DIAGNOSIS — N95.0 PMB (POSTMENOPAUSAL BLEEDING): Primary | ICD-10-CM

## 2025-06-20 PROCEDURE — 99214 OFFICE O/P EST MOD 30 MIN: CPT | Performed by: OBSTETRICS & GYNECOLOGY

## 2025-06-20 ASSESSMENT — PATIENT HEALTH QUESTIONNAIRE - PHQ9
1. LITTLE INTEREST OR PLEASURE IN DOING THINGS: NOT AT ALL
SUM OF ALL RESPONSES TO PHQ QUESTIONS 1-9: 0
2. FEELING DOWN, DEPRESSED OR HOPELESS: NOT AT ALL

## 2025-06-20 NOTE — PROGRESS NOTES
Sharyn Beck is a 54 y.o. female  who is seen in consultation today for evaluation and treatment of PMB/menorrhagia.    Sharyn Beck continues to have ongoing issues with bleeding on her HRT.  See previous notes.  She has been requiring high dose of Provera for management of her bleeding.  Even with this, she will continue to have menorrhagia.  See previous notes.  Previous endometrial sampling showed weakly proliferative endometrium.    Management options are reviewed with her including ongoing medical management attempts versus surgery with hysteroscopy D&C.  We also discussed additional treatment with endometrial ablation and she is interested in this.    Sharyn Beck has completed childbearing and she assures me that she has no intention of future childbearing.    Patient Active Problem List   Diagnosis    Acquired hypothyroidism    Goiter    Anxiety    Class 1 obesity due to excess calories without serious comorbidity with body mass index (BMI) of 33.0 to 33.9 in adult    Patellofemoral arthralgia of both knees    Prediabetes    Overweight (BMI 25.0-29.9)    Gross hematuria    Benign essential microscopic hematuria    Postmenopausal estrogen deficiency    Chalazion left upper eyelid    PMB (postmenopausal bleeding)    Menorrhagia with regular cycle     Review of systems:  No complaints of symptoms involving:  Constitutional: negative for fever, chills.  Eyes: No change in vision, double vision, or scotomata.  HENT: No sore throat, ear pain or nasal congestion.  Respiratory: No cough, shortness of breath or hemoptysis.  Cardiovascular: No chest pain, orthopnea, fainting.  Skin: No pruritus or generalized rash.  Neurologic: No focal weakness or sensory changes.   Gynecologic: see HPI    Past Medical History:   Diagnosis Date    Allergic rhinitis     Attention deficit disorder (ADD) without hyperactivity 12/22/2017    Blood clot in abdominal vein 07/1994    superior mesentary artery with appendicitis, sepsis

## 2025-06-23 ENCOUNTER — PREP FOR PROCEDURE (OUTPATIENT)
Age: 55
End: 2025-06-23

## 2025-06-23 DIAGNOSIS — N92.0 MENORRHAGIA WITH REGULAR CYCLE: ICD-10-CM

## 2025-06-23 DIAGNOSIS — N95.0 PMB (POSTMENOPAUSAL BLEEDING): ICD-10-CM

## 2025-06-23 RX ORDER — ACETAMINOPHEN 325 MG/1
1000 TABLET ORAL ONCE
Status: CANCELLED | OUTPATIENT
Start: 2025-07-10 | End: 2025-06-23

## 2025-06-23 RX ORDER — SODIUM CHLORIDE 0.9 % (FLUSH) 0.9 %
5-40 SYRINGE (ML) INJECTION PRN
Status: CANCELLED | OUTPATIENT
Start: 2025-07-10

## 2025-06-23 RX ORDER — SODIUM CHLORIDE, SODIUM LACTATE, POTASSIUM CHLORIDE, CALCIUM CHLORIDE 600; 310; 30; 20 MG/100ML; MG/100ML; MG/100ML; MG/100ML
INJECTION, SOLUTION INTRAVENOUS CONTINUOUS
Status: CANCELLED | OUTPATIENT
Start: 2025-07-10

## 2025-06-23 RX ORDER — METRONIDAZOLE 500 MG/100ML
500 INJECTION, SOLUTION INTRAVENOUS
Status: CANCELLED | OUTPATIENT
Start: 2025-07-10 | End: 2025-07-10

## 2025-06-23 RX ORDER — SODIUM CHLORIDE 0.9 % (FLUSH) 0.9 %
5-40 SYRINGE (ML) INJECTION EVERY 12 HOURS SCHEDULED
Status: CANCELLED | OUTPATIENT
Start: 2025-07-10

## 2025-06-23 RX ORDER — LEVOFLOXACIN 5 MG/ML
500 INJECTION, SOLUTION INTRAVENOUS
Status: CANCELLED | OUTPATIENT
Start: 2025-07-10 | End: 2025-07-10

## 2025-06-26 ENCOUNTER — PATIENT MESSAGE (OUTPATIENT)
Dept: FAMILY MEDICINE CLINIC | Age: 55
End: 2025-06-26

## 2025-06-26 DIAGNOSIS — Z01.818 PREOP EXAM FOR INTERNAL MEDICINE: Primary | ICD-10-CM

## 2025-06-27 DIAGNOSIS — Z01.818 PREOP EXAM FOR INTERNAL MEDICINE: ICD-10-CM

## 2025-06-27 LAB
DEPRECATED RDW RBC AUTO: 13.5 % (ref 12.4–15.4)
HCT VFR BLD AUTO: 40.9 % (ref 36–48)
HGB BLD-MCNC: 14.4 G/DL (ref 12–16)
MCH RBC QN AUTO: 30.4 PG (ref 26–34)
MCHC RBC AUTO-ENTMCNC: 35.1 G/DL (ref 31–36)
MCV RBC AUTO: 86.6 FL (ref 80–100)
PLATELET # BLD AUTO: 301 K/UL (ref 135–450)
PMV BLD AUTO: 8.8 FL (ref 5–10.5)
RBC # BLD AUTO: 4.72 M/UL (ref 4–5.2)
WBC # BLD AUTO: 5.7 K/UL (ref 4–11)

## 2025-06-28 LAB
25(OH)D3 SERPL-MCNC: 33.1 NG/ML
ALBUMIN SERPL-MCNC: 4.2 G/DL (ref 3.4–5)
ALBUMIN/GLOB SERPL: 1.6 {RATIO} (ref 1.1–2.2)
ALP SERPL-CCNC: 56 U/L (ref 40–129)
ALT SERPL-CCNC: 8 U/L (ref 10–40)
ANION GAP SERPL CALCULATED.3IONS-SCNC: 12 MMOL/L (ref 3–16)
AST SERPL-CCNC: 18 U/L (ref 15–37)
BILIRUB SERPL-MCNC: 0.4 MG/DL (ref 0–1)
BUN SERPL-MCNC: 12 MG/DL (ref 7–20)
CALCIUM SERPL-MCNC: 9.1 MG/DL (ref 8.3–10.6)
CHLORIDE SERPL-SCNC: 98 MMOL/L (ref 99–110)
CHOLEST SERPL-MCNC: 195 MG/DL (ref 0–199)
CO2 SERPL-SCNC: 27 MMOL/L (ref 21–32)
CREAT SERPL-MCNC: 0.8 MG/DL (ref 0.6–1.1)
CREAT UR-MCNC: 221 MG/DL (ref 28–259)
GFR SERPLBLD CREATININE-BSD FMLA CKD-EPI: 87 ML/MIN/{1.73_M2}
GLUCOSE SERPL-MCNC: 87 MG/DL (ref 70–99)
HDLC SERPL-MCNC: 65 MG/DL (ref 40–60)
LDL CHOLESTEROL: 119 MG/DL
MICROALBUMIN UR DL<=1MG/L-MCNC: 10.7 MG/DL
MICROALBUMIN/CREAT UR: 48.4 MG/G (ref 0–30)
POTASSIUM SERPL-SCNC: 3.5 MMOL/L (ref 3.5–5.1)
PROT SERPL-MCNC: 6.8 G/DL (ref 6.4–8.2)
SODIUM SERPL-SCNC: 137 MMOL/L (ref 136–145)
TRIGL SERPL-MCNC: 54 MG/DL (ref 0–150)
TSH SERPL DL<=0.005 MIU/L-ACNC: 3.41 UIU/ML (ref 0.27–4.2)
VLDLC SERPL CALC-MCNC: 11 MG/DL

## 2025-07-03 ENCOUNTER — OFFICE VISIT (OUTPATIENT)
Dept: FAMILY MEDICINE CLINIC | Age: 55
End: 2025-07-03
Payer: COMMERCIAL

## 2025-07-03 VITALS
OXYGEN SATURATION: 98 % | TEMPERATURE: 98.5 F | WEIGHT: 164 LBS | HEIGHT: 68 IN | BODY MASS INDEX: 24.86 KG/M2 | DIASTOLIC BLOOD PRESSURE: 80 MMHG | HEART RATE: 80 BPM | SYSTOLIC BLOOD PRESSURE: 118 MMHG

## 2025-07-03 DIAGNOSIS — N92.0 MENORRHAGIA WITH REGULAR CYCLE: ICD-10-CM

## 2025-07-03 DIAGNOSIS — E03.9 ACQUIRED HYPOTHYROIDISM: ICD-10-CM

## 2025-07-03 DIAGNOSIS — Z01.818 PREOP EXAMINATION: ICD-10-CM

## 2025-07-03 DIAGNOSIS — Z01.811 PRE-OP CHEST EXAM: Primary | ICD-10-CM

## 2025-07-03 PROCEDURE — 93000 ELECTROCARDIOGRAM COMPLETE: CPT | Performed by: NURSE PRACTITIONER

## 2025-07-03 PROCEDURE — 99214 OFFICE O/P EST MOD 30 MIN: CPT | Performed by: NURSE PRACTITIONER

## 2025-07-03 SDOH — ECONOMIC STABILITY: FOOD INSECURITY: WITHIN THE PAST 12 MONTHS, YOU WORRIED THAT YOUR FOOD WOULD RUN OUT BEFORE YOU GOT MONEY TO BUY MORE.: PATIENT DECLINED

## 2025-07-03 SDOH — ECONOMIC STABILITY: FOOD INSECURITY: WITHIN THE PAST 12 MONTHS, THE FOOD YOU BOUGHT JUST DIDN'T LAST AND YOU DIDN'T HAVE MONEY TO GET MORE.: PATIENT DECLINED

## 2025-07-03 ASSESSMENT — ENCOUNTER SYMPTOMS
ABDOMINAL PAIN: 0
SINUS PRESSURE: 0
COUGH: 0
DIARRHEA: 0
SHORTNESS OF BREATH: 0
BACK PAIN: 0
SINUS PAIN: 0
WHEEZING: 0
COLOR CHANGE: 0
CONSTIPATION: 0

## 2025-07-03 NOTE — PROGRESS NOTES
Subjective:     Sharyn Bcek who presentsto the office today for a preoperative consultation at the request of surgeon Dr. Boone who plans on performing Hysteroscopy, dilation and curettage, endometrial ablation  on 7/10/2025. Planned anesthesia is General.  The patient has the following known anesthesia issues: none  Patient has a bleeding risk of : no recent abnormal bleeding, no remote history of abnormal bleeding.  Patient's medications, allergies, past medical, surgical,social and family histories were reviewed and updated as appropriate.    Past Medical History:   Diagnosis Date    Allergic rhinitis     Attention deficit disorder (ADD) without hyperactivity 12/22/2017    Blood clot in abdominal vein 07/1994    superior mesentary artery with appendicitis, sepsis    DUB (dysfunctional uterine bleeding) 01/26/2020    Goiter     Hypothyroidism     Osteoarthritis      Past Surgical History:   Procedure Laterality Date    ABDOMINOPLASTY  10/2013    diastasis rectus, hernia repair    APPENDECTOMY  07/1994    COSMETIC SURGERY  10/2014    Abdominoplasty    TONSILLECTOMY       Family History   Problem Relation Age of Onset    Colon Polyps Father     Breast Cancer Maternal Grandmother 58    Heart Attack Maternal Grandfather 50        morbidly obese    Colon Cancer Paternal Grandmother 85    COPD Paternal Grandfather     Heart Attack Maternal Uncle 48        sudden cardiac death, healthy    Arthritis Mother     Alcohol Abuse Brother     Alcohol Abuse Sister      Social History     Socioeconomic History    Marital status:      Spouse name: Not on file    Number of children: Not on file    Years of education: Not on file    Highest education level: Not on file   Occupational History    Not on file   Tobacco Use    Smoking status: Never    Smokeless tobacco: Never   Substance and Sexual Activity    Alcohol use: Yes     Alcohol/week: 2.0 standard drinks of alcohol     Types: 2 Drinks containing 0.5 oz of alcohol

## 2025-07-03 NOTE — ASSESSMENT & PLAN NOTE
Perioperative risk related to the patient's upcoming surgery is considered low. she is cleared for surgery.  Pre-op exam was completed on 7/3/25 12:10 PM.    - Reviewed recent labs  -EKG shows normal sinus rhythm without blocks or defects.

## 2025-07-03 NOTE — ASSESSMENT & PLAN NOTE
Monitored by specialist- no acute findings meriting change in the plan.  Proceed with surgery as planned

## 2025-07-07 ENCOUNTER — RESULTS FOLLOW-UP (OUTPATIENT)
Dept: FAMILY MEDICINE CLINIC | Age: 55
End: 2025-07-07

## 2025-07-07 NOTE — PROGRESS NOTES
[facility] PRE-SURGICAL TESTING INSTRUCTIONS                      PRIOR TO PROCEDURE DATE:    1. PLEASE FOLLOW ANY INSTRUCTIONS GIVEN TO YOU PER YOUR SURGEON.      2. Arrange for someone to drive you home and be with you for the first 24 hours after discharge for your safety after your procedure for which you received sedation. Ensure it is someone we can share information with regarding your discharge.     NOTE: At this time ONLY 2 ADULTS may accompany you   One person ENCOURAGED to stay at hospital entire time if outpatient surgery      3. You must contact your surgeon for instructions IF:  You are taking any blood thinners, aspirin, anti-inflammatory or vitamins.  There is a change in your physical condition such as a cold, fever, rash, cuts, sores, or any other infection, especially near your surgical site.    4. Do not drink alcohol the day before or day of your procedure.  Do not use any recreational marijuana at least 24 hours or street drugs (heroin, cocaine) at minimum 5 days prior to your procedure.     5. A Pre-Surgical History and Physical MUST be completed WITHIN 30 DAYS OR LESS prior to your procedure.by your Physician or an Urgent Care        THE DAY OF YOUR PROCEDURE:  1.  Follow instructions for ARRIVAL TIME as DIRECTED BY YOUR SURGEON.     2. Enter the MAIN entrance from [facility] [employer] and follow the signs to the free Parking Garage or  Parking (offered free of charge 7 am-5pm).      3. Enter the Main Entrance of the hospital (do not enter from the lower level of the parking garage). Upon entrance, check in with the  at the surgical information desk on your LEFT.   Bring your insurance card and photo ID to register      4. DO NOT EAT ANYTHING AFTER MIDNIGHT prior to arrival for surgery.    NOTE: ALL Gastric, Bariatric & Bowel surgery patients - you MUST follow your surgeon's instructions regarding eating/ drinking as you will have very specific instructions to follow.  If

## 2025-07-07 NOTE — PROGRESS NOTES
7/7/25 0851: Abnormal labs routed to surgeon.- BDP    Clear bilaterally, pupils equal, round and reactive to light.

## 2025-07-09 NOTE — H&P
Update History & Physical    The patient's History and Physical of July 3, 2025 was reviewed with the patient and I examined the patient. There was no change. The surgical site was confirmed by the patient and me.       Plan: The risks, benefits, expected outcome, and alternative to the recommended procedure have been discussed with the patient. Patient understands and wants to proceed with the procedure.

## 2025-07-09 NOTE — OP NOTE
Operative Note      Patient: Sharyn Beck  YOB: 1970  MRN: [id number]    Date of Procedure: 7/10/2025    Pre-Op Diagnosis Codes:      * PMB (postmenopausal bleeding) [N95.0]     * Menorrhagia with regular cycle [N92.0]    Post-Op Diagnosis: Same       Procedure(s):  Hysteroscopy, dilation and curettage, endometrial ablation    Surgeon(s):  Jean Boone MD    ANESTHESIA: MAC.     INDICATIONS: Sharyn Beck is a 54 y.o. female who has completed childbearing and suffers from postmenopausal bleeding/menorrhagia. She desires the proposed procedure.  She declines expectant management or medical management or alternative therapies. She was made aware of the risks of the procedure including but not limited to bleeding, blood clot, infection, uterine perforation, damage to internal organs, need for additional surgery, failure to diagnose and failure to control her bleeding to her satisfaction. All questions were answered and she wished to proceed.     OPERATIVE FINDINGS: Normal uterine cavity.   Uterine cavity measured 4 x 4.3 cm.    TECHNIQUE:   The patient was taken to the operating room and placed in supine position. Anesthesia was administered by anesthesia department. She was then placed in modified dorsal lithotomy position in candy cane stirrups.  Examination was performed. She was then prepped and draped in usual sterile fashion for the proposed procedure. Time-out was taken to positively identify the patient and confirm the procedure and confirm that the patient received prophylactic antibiotics.    Hysteroscopy D&C and NovaSure ablation:  A speculum was placed in the vagina. The anterior lip of the cervix was grasped with an Allis clamp. Gentle cervical dilation was performed. Hysteroscopy was carried out in usual fashion and revealed the aforementioned findings. The uterine cavity was measured.    Endometrial curettage was performed and sent to Pathology for analysis.    The

## 2025-07-09 NOTE — DISCHARGE INSTRUCTIONS
Activity:  You may feel sleepy for the next 24 hours. This is due to the medication you received during your procedure. You should have a responsible adult with you for the rest of the day and during the night. This is for your own safety and protection. You may be up and about according to your instructions. You should be urinating every 4-6 hours as needed.    You may use stairs.  You may shower.  Nothing in the vagina: no sex or tampons for 4 weeks.    For the next 24 hours, you should:  Not drive a car  Not operate machinery or power tools  Not drink alcoholic beverages, including beer  Not make any important decisions or sign important papers     The following restrictions should be observed:  Take showers instead of tub baths  No hot tubs, whirlpools, bath tubs, swimming pools  No sexual activity until advised  Rest for the day, then resume normal activity as you are able  No tampons or douching until advised    What to expect  Cramping  Bleeding (intermittently up to 2-3 weeks)  Some soreness in legs and back     Diet:  Progress slowly to a regular diet, especially if you have had a general anesthetic. Start by taking liquids. If you have no nausea, try soup and crackers, then solid food.     Medications:  You may have some pain and or lower abdominal cramping.   Take medication with food to prevent an upset stomach.    You can use ibuprofen 600 mg (3 regular strength tablets) every 6 hours as needed for pain.    You can use Tylenol 1000 mg (2 extra strength tablets) every 8 hours as needed for pain with the ibuprofen.    Follow-up in the office with Dr. Boone in 2 weeks.    When to call:  If you have a temperature of 100.4 degrees Fahrenheit orally  If you have severe pain or cramping  If you have bleeding heavier than your normal period or you pass large clots  If you have foul smelling discharge  If you are having problems coping  If you have any problems or questions please call

## 2025-07-10 ENCOUNTER — ANESTHESIA EVENT (OUTPATIENT)
Dept: OPERATING ROOM | Age: 55
End: 2025-07-10
Payer: COMMERCIAL

## 2025-07-10 ENCOUNTER — HOSPITAL ENCOUNTER (OUTPATIENT)
Age: 55
Setting detail: OUTPATIENT SURGERY
Discharge: HOME | End: 2025-07-10
Attending: OBSTETRICS & GYNECOLOGY | Admitting: OBSTETRICS & GYNECOLOGY
Payer: COMMERCIAL

## 2025-07-10 ENCOUNTER — ANESTHESIA (OUTPATIENT)
Dept: OPERATING ROOM | Age: 55
End: 2025-07-10
Payer: COMMERCIAL

## 2025-07-10 VITALS
HEIGHT: 67 IN | DIASTOLIC BLOOD PRESSURE: 75 MMHG | BODY MASS INDEX: 26.37 KG/M2 | TEMPERATURE: 97.3 F | SYSTOLIC BLOOD PRESSURE: 110 MMHG | RESPIRATION RATE: 14 BRPM | WEIGHT: 168 LBS | HEART RATE: 59 BPM | OXYGEN SATURATION: 96 %

## 2025-07-10 DIAGNOSIS — N95.0 PMB (POSTMENOPAUSAL BLEEDING): ICD-10-CM

## 2025-07-10 DIAGNOSIS — N92.0 MENORRHAGIA WITH REGULAR CYCLE: ICD-10-CM

## 2025-07-10 LAB — HCG UR QL: NEGATIVE

## 2025-07-10 PROCEDURE — 2500000003 HC RX 250 WO HCPCS: Performed by: NURSE ANESTHETIST, CERTIFIED REGISTERED

## 2025-07-10 PROCEDURE — 6370000000 HC RX 637 (ALT 250 FOR IP): Performed by: OBSTETRICS & GYNECOLOGY

## 2025-07-10 PROCEDURE — 2580000003 HC RX 258: Performed by: OBSTETRICS & GYNECOLOGY

## 2025-07-10 PROCEDURE — 6360000002 HC RX W HCPCS: Performed by: ANESTHESIOLOGY

## 2025-07-10 PROCEDURE — 84703 CHORIONIC GONADOTROPIN ASSAY: CPT

## 2025-07-10 PROCEDURE — 3700000001 HC ADD 15 MINUTES (ANESTHESIA): Performed by: OBSTETRICS & GYNECOLOGY

## 2025-07-10 PROCEDURE — 7100000001 HC PACU RECOVERY - ADDTL 15 MIN: Performed by: OBSTETRICS & GYNECOLOGY

## 2025-07-10 PROCEDURE — 7100000010 HC PHASE II RECOVERY - FIRST 15 MIN: Performed by: OBSTETRICS & GYNECOLOGY

## 2025-07-10 PROCEDURE — 2720000010 HC SURG SUPPLY STERILE: Performed by: OBSTETRICS & GYNECOLOGY

## 2025-07-10 PROCEDURE — 3700000000 HC ANESTHESIA ATTENDED CARE: Performed by: OBSTETRICS & GYNECOLOGY

## 2025-07-10 PROCEDURE — 6370000000 HC RX 637 (ALT 250 FOR IP): Performed by: ANESTHESIOLOGY

## 2025-07-10 PROCEDURE — 3600000004 HC SURGERY LEVEL 4 BASE: Performed by: OBSTETRICS & GYNECOLOGY

## 2025-07-10 PROCEDURE — 7100000000 HC PACU RECOVERY - FIRST 15 MIN: Performed by: OBSTETRICS & GYNECOLOGY

## 2025-07-10 PROCEDURE — 7100000011 HC PHASE II RECOVERY - ADDTL 15 MIN: Performed by: OBSTETRICS & GYNECOLOGY

## 2025-07-10 PROCEDURE — 6360000002 HC RX W HCPCS: Performed by: NURSE ANESTHETIST, CERTIFIED REGISTERED

## 2025-07-10 PROCEDURE — 3600000014 HC SURGERY LEVEL 4 ADDTL 15MIN: Performed by: OBSTETRICS & GYNECOLOGY

## 2025-07-10 PROCEDURE — 88305 TISSUE EXAM BY PATHOLOGIST: CPT

## 2025-07-10 PROCEDURE — 6360000002 HC RX W HCPCS: Performed by: OBSTETRICS & GYNECOLOGY

## 2025-07-10 PROCEDURE — 2709999900 HC NON-CHARGEABLE SUPPLY: Performed by: OBSTETRICS & GYNECOLOGY

## 2025-07-10 PROCEDURE — 2580000003 HC RX 258: Performed by: NURSE ANESTHETIST, CERTIFIED REGISTERED

## 2025-07-10 RX ORDER — SODIUM CHLORIDE 9 MG/ML
INJECTION, SOLUTION INTRAVENOUS PRN
Status: DISCONTINUED | OUTPATIENT
Start: 2025-07-10 | End: 2025-07-10 | Stop reason: HOSPADM

## 2025-07-10 RX ORDER — PROCHLORPERAZINE EDISYLATE 5 MG/ML
5 INJECTION INTRAMUSCULAR; INTRAVENOUS
Status: DISCONTINUED | OUTPATIENT
Start: 2025-07-10 | End: 2025-07-10 | Stop reason: HOSPADM

## 2025-07-10 RX ORDER — SODIUM CHLORIDE 0.9 % (FLUSH) 0.9 %
5-40 SYRINGE (ML) INJECTION PRN
Status: DISCONTINUED | OUTPATIENT
Start: 2025-07-10 | End: 2025-07-10 | Stop reason: HOSPADM

## 2025-07-10 RX ORDER — LEVOFLOXACIN 5 MG/ML
500 INJECTION, SOLUTION INTRAVENOUS
Status: COMPLETED | OUTPATIENT
Start: 2025-07-10 | End: 2025-07-10

## 2025-07-10 RX ORDER — SODIUM CHLORIDE, SODIUM LACTATE, POTASSIUM CHLORIDE, CALCIUM CHLORIDE 600; 310; 30; 20 MG/100ML; MG/100ML; MG/100ML; MG/100ML
INJECTION, SOLUTION INTRAVENOUS CONTINUOUS
Status: DISCONTINUED | OUTPATIENT
Start: 2025-07-10 | End: 2025-07-10 | Stop reason: HOSPADM

## 2025-07-10 RX ORDER — FENTANYL CITRATE 50 UG/ML
25 INJECTION, SOLUTION INTRAMUSCULAR; INTRAVENOUS EVERY 5 MIN PRN
Status: DISCONTINUED | OUTPATIENT
Start: 2025-07-10 | End: 2025-07-10 | Stop reason: HOSPADM

## 2025-07-10 RX ORDER — IPRATROPIUM BROMIDE AND ALBUTEROL SULFATE 2.5; .5 MG/3ML; MG/3ML
1 SOLUTION RESPIRATORY (INHALATION)
Status: DISCONTINUED | OUTPATIENT
Start: 2025-07-10 | End: 2025-07-10 | Stop reason: HOSPADM

## 2025-07-10 RX ORDER — SODIUM CHLORIDE, SODIUM LACTATE, POTASSIUM CHLORIDE, CALCIUM CHLORIDE 600; 310; 30; 20 MG/100ML; MG/100ML; MG/100ML; MG/100ML
INJECTION, SOLUTION INTRAVENOUS
Status: DISCONTINUED | OUTPATIENT
Start: 2025-07-10 | End: 2025-07-10 | Stop reason: SDUPTHER

## 2025-07-10 RX ORDER — ONDANSETRON 2 MG/ML
INJECTION INTRAMUSCULAR; INTRAVENOUS
Status: DISCONTINUED | OUTPATIENT
Start: 2025-07-10 | End: 2025-07-10 | Stop reason: SDUPTHER

## 2025-07-10 RX ORDER — DIPHENHYDRAMINE HYDROCHLORIDE 50 MG/ML
12.5 INJECTION, SOLUTION INTRAMUSCULAR; INTRAVENOUS
Status: DISCONTINUED | OUTPATIENT
Start: 2025-07-10 | End: 2025-07-10 | Stop reason: HOSPADM

## 2025-07-10 RX ORDER — HYDROMORPHONE HYDROCHLORIDE 1 MG/ML
0.5 INJECTION, SOLUTION INTRAMUSCULAR; INTRAVENOUS; SUBCUTANEOUS EVERY 5 MIN PRN
Status: DISCONTINUED | OUTPATIENT
Start: 2025-07-10 | End: 2025-07-10 | Stop reason: HOSPADM

## 2025-07-10 RX ORDER — OXYCODONE HYDROCHLORIDE 5 MG/1
5 TABLET ORAL
Status: COMPLETED | OUTPATIENT
Start: 2025-07-10 | End: 2025-07-10

## 2025-07-10 RX ORDER — KETAMINE HCL IN NACL, ISO-OSM 20 MG/2 ML
SYRINGE (ML) INJECTION
Status: DISCONTINUED | OUTPATIENT
Start: 2025-07-10 | End: 2025-07-10 | Stop reason: SDUPTHER

## 2025-07-10 RX ORDER — METRONIDAZOLE 500 MG/100ML
500 INJECTION, SOLUTION INTRAVENOUS
Status: COMPLETED | OUTPATIENT
Start: 2025-07-10 | End: 2025-07-10

## 2025-07-10 RX ORDER — FENTANYL CITRATE 50 UG/ML
INJECTION, SOLUTION INTRAMUSCULAR; INTRAVENOUS
Status: DISCONTINUED | OUTPATIENT
Start: 2025-07-10 | End: 2025-07-10 | Stop reason: SDUPTHER

## 2025-07-10 RX ORDER — ONDANSETRON 2 MG/ML
4 INJECTION INTRAMUSCULAR; INTRAVENOUS
Status: DISCONTINUED | OUTPATIENT
Start: 2025-07-10 | End: 2025-07-10 | Stop reason: HOSPADM

## 2025-07-10 RX ORDER — ACETAMINOPHEN 325 MG/1
1000 TABLET ORAL ONCE
Status: COMPLETED | OUTPATIENT
Start: 2025-07-10 | End: 2025-07-10

## 2025-07-10 RX ORDER — LIDOCAINE HCL/PF 100 MG/5ML
SYRINGE (ML) INJECTION
Status: DISCONTINUED | OUTPATIENT
Start: 2025-07-10 | End: 2025-07-10 | Stop reason: SDUPTHER

## 2025-07-10 RX ORDER — MEPERIDINE HYDROCHLORIDE 25 MG/ML
12.5 INJECTION INTRAMUSCULAR; INTRAVENOUS; SUBCUTANEOUS EVERY 5 MIN PRN
Status: DISCONTINUED | OUTPATIENT
Start: 2025-07-10 | End: 2025-07-10 | Stop reason: HOSPADM

## 2025-07-10 RX ORDER — MIDAZOLAM HYDROCHLORIDE 1 MG/ML
INJECTION, SOLUTION INTRAMUSCULAR; INTRAVENOUS
Status: DISCONTINUED | OUTPATIENT
Start: 2025-07-10 | End: 2025-07-10 | Stop reason: SDUPTHER

## 2025-07-10 RX ORDER — SODIUM CHLORIDE 0.9 % (FLUSH) 0.9 %
5-40 SYRINGE (ML) INJECTION EVERY 12 HOURS SCHEDULED
Status: DISCONTINUED | OUTPATIENT
Start: 2025-07-10 | End: 2025-07-10 | Stop reason: HOSPADM

## 2025-07-10 RX ORDER — PROPOFOL 10 MG/ML
INJECTION, EMULSION INTRAVENOUS
Status: DISCONTINUED | OUTPATIENT
Start: 2025-07-10 | End: 2025-07-10 | Stop reason: SDUPTHER

## 2025-07-10 RX ORDER — LABETALOL HYDROCHLORIDE 5 MG/ML
10 INJECTION, SOLUTION INTRAVENOUS
Status: DISCONTINUED | OUTPATIENT
Start: 2025-07-10 | End: 2025-07-10 | Stop reason: HOSPADM

## 2025-07-10 RX ADMIN — ACETAMINOPHEN 975 MG: 325 TABLET ORAL at 07:23

## 2025-07-10 RX ADMIN — HYDROMORPHONE HYDROCHLORIDE 0.5 MG: 1 INJECTION, SOLUTION INTRAMUSCULAR; INTRAVENOUS; SUBCUTANEOUS at 08:38

## 2025-07-10 RX ADMIN — LEVOFLOXACIN 500 MG: 5 INJECTION, SOLUTION INTRAVENOUS at 08:03

## 2025-07-10 RX ADMIN — METRONIDAZOLE 500 MG: 500 INJECTION, SOLUTION INTRAVENOUS at 07:52

## 2025-07-10 RX ADMIN — FENTANYL CITRATE 50 MCG: 50 INJECTION, SOLUTION INTRAMUSCULAR; INTRAVENOUS at 07:54

## 2025-07-10 RX ADMIN — ONDANSETRON 4 MG: 2 INJECTION, SOLUTION INTRAMUSCULAR; INTRAVENOUS at 07:59

## 2025-07-10 RX ADMIN — PROPOFOL 100 MCG/KG/MIN: 10 INJECTION, EMULSION INTRAVENOUS at 07:54

## 2025-07-10 RX ADMIN — Medication 20 MG: at 07:52

## 2025-07-10 RX ADMIN — OXYCODONE 5 MG: 5 TABLET ORAL at 09:03

## 2025-07-10 RX ADMIN — SODIUM CHLORIDE, SODIUM LACTATE, POTASSIUM CHLORIDE, AND CALCIUM CHLORIDE: .6; .31; .03; .02 INJECTION, SOLUTION INTRAVENOUS at 07:49

## 2025-07-10 RX ADMIN — Medication 100 MG: at 07:54

## 2025-07-10 RX ADMIN — FENTANYL CITRATE 50 MCG: 50 INJECTION, SOLUTION INTRAMUSCULAR; INTRAVENOUS at 07:57

## 2025-07-10 RX ADMIN — PROPOFOL 50 MG: 10 INJECTION, EMULSION INTRAVENOUS at 08:08

## 2025-07-10 RX ADMIN — SODIUM CHLORIDE, SODIUM LACTATE, POTASSIUM CHLORIDE, AND CALCIUM CHLORIDE: .6; .31; .03; .02 INJECTION, SOLUTION INTRAVENOUS at 07:22

## 2025-07-10 RX ADMIN — HYDROMORPHONE HYDROCHLORIDE 0.5 MG: 1 INJECTION, SOLUTION INTRAMUSCULAR; INTRAVENOUS; SUBCUTANEOUS at 08:45

## 2025-07-10 RX ADMIN — MIDAZOLAM HYDROCHLORIDE 2 MG: 1 INJECTION, SOLUTION INTRAMUSCULAR; INTRAVENOUS at 07:49

## 2025-07-10 ASSESSMENT — PAIN DESCRIPTION - LOCATION
LOCATION: ABDOMEN
LOCATION: ABDOMEN

## 2025-07-10 ASSESSMENT — PAIN DESCRIPTION - DIRECTION: RADIATING_TOWARDS: ACROSS LOWER ABD

## 2025-07-10 ASSESSMENT — PAIN DESCRIPTION - ORIENTATION
ORIENTATION: LOWER;RIGHT;LEFT
ORIENTATION: MID
ORIENTATION: MID

## 2025-07-10 ASSESSMENT — PAIN - FUNCTIONAL ASSESSMENT: PAIN_FUNCTIONAL_ASSESSMENT: 0-10

## 2025-07-10 ASSESSMENT — PAIN DESCRIPTION - ONSET
ONSET: ON-GOING

## 2025-07-10 ASSESSMENT — PAIN SCALES - GENERAL
PAINLEVEL_OUTOF10: 3
PAINLEVEL_OUTOF10: 6
PAINLEVEL_OUTOF10: 7
PAINLEVEL_OUTOF10: 4

## 2025-07-10 ASSESSMENT — PAIN DESCRIPTION - FREQUENCY
FREQUENCY: CONTINUOUS

## 2025-07-10 ASSESSMENT — PAIN DESCRIPTION - PAIN TYPE
TYPE: SURGICAL PAIN
TYPE: ACUTE PAIN;SURGICAL PAIN
TYPE: SURGICAL PAIN

## 2025-07-10 ASSESSMENT — PAIN DESCRIPTION - DESCRIPTORS
DESCRIPTORS: CRAMPING
DESCRIPTORS: ACHING
DESCRIPTORS: CRAMPING

## 2025-07-10 NOTE — ANESTHESIA POSTPROCEDURE EVALUATION
Department of Anesthesiology  Postprocedure Note    Patient: Sharyn Beck  MRN: [id number]  YOB: 1970  Date of evaluation: 7/10/2025    Procedure Summary       Date: 07/10/25 Room / Location: [place] [id number] / [facility]    Anesthesia Start: 0749 Anesthesia Stop: 0828    Procedure: Hysteroscopy, dilation and curettage, endometrial ablation (Vagina ) Diagnosis:       PMB (postmenopausal bleeding)      Menorrhagia with regular cycle      (PMB (postmenopausal bleeding) [N95.0])      (Menorrhagia with regular cycle [N92.0])    Surgeons: Jean Boone MD Responsible Provider: Ness Sprague MD    Anesthesia Type: MAC, general ASA Status: 2            Anesthesia Type: No value filed.    Janell Phase I: Janell Score: 10    Janell Phase II: Janell Score: 10    Anesthesia Post Evaluation    Patient location during evaluation: PACU  Patient participation: complete - patient participated  Level of consciousness: awake and awake and alert  Airway patency: patent  Nausea & Vomiting: no nausea and no vomiting  Cardiovascular status: hemodynamically stable  Respiratory status: acceptable  Hydration status: euvolemic  Pain management: adequate and satisfactory to patient    No notable events documented.

## 2025-07-10 NOTE — PROGRESS NOTES
PACU Transfer to [facility]    Vitals:    07/10/25 0915   BP: 104/82   Pulse: 58   Resp: 16   Temp: 97.4 °F (36.3 °C)   SpO2: 100%         Intake/Output Summary (Last 24 hours) at 7/10/2025 0925  Last data filed at 7/10/2025 0925  Gross per 24 hour   Intake 343 ml   Output 0 ml   Net 343 ml       Pain assessment:  none  Pain Level: 3    Patient transferred to care of [facility] RN.    7/10/2025 9:25 AM

## 2025-07-10 NOTE — PROGRESS NOTES
Patient admitted to PACU # 10 from OR at 0825 post Hysteroscopy, dilation and curettage, endometrial ablation  per Dr. Boone.  Attached to PACU monitoring system and report received from anesthesia provider. Pt arrived to pacu on RA. Pt reports pain as tolerable. Pt has mesh panties and peripad c/d/I. Will continue to monitor.

## 2025-07-10 NOTE — PROGRESS NOTES
Ambulatory Surgery/Procedure Discharge Note    Vitals:    07/10/25 0926   BP: 110/75   Pulse: 59   Resp: 14   Temp: 97.3 °F (36.3 °C)   SpO2: 96%       In: 343 [P.O.:240; I.V.:103]  Out: 0  Pt drank a soda; declined offer of bathroom    Restroom use offered before discharge.  Yes    Pain assessment:  present - adequately treated and location, lower abdomen. Pt had oxycodone 5 mg in PACU prior to arrival to [facility]  Pain Level: 4    Pt arrived to [facility] from PACU s/p hysteroscopy, D&C, endometrial ablation.  Pt alert; speech clear; breathing easily on RA; states pain in lower abd \"cramping\" and is 4/10 and \"better\" after receiving oxycodone in PACU.  Pt drinking pepsi and tolerating well.  Has scant serosanguinous drainage from vagina on pad.    Discharge instructions discussed with pt and spouse Tana, and both verbalized understanding.  IV removed, and dressing applied.  No new RX's this visit.  Provided extra pair of surgical underwear and pad for pad.  Tana assisted pt in dressing for discharge.    Patient discharged to home/self care. Patient discharged via wheel chair by this RN to waiting family/S.O.       7/10/2025 10:08 AM

## 2025-07-10 NOTE — ANESTHESIA PRE PROCEDURE
ALT 8 06/27/2025 07:47 AM       POC Tests: No results for input(s): \"POCGLU\", \"POCNA\", \"POCK\", \"POCCL\", \"POCBUN\", \"POCHEMO\", \"POCHCT\" in the last 72 hours.    Coags: No results found for: \"PROTIME\", \"INR\", \"APTT\"    HCG (If Applicable):   Lab Results   Component Value Date    PREGTESTUR Negative 07/10/2025        ABGs: No results found for: \"PHART\", \"PO2ART\", \"KHP4GHO\", \"BDY6GVC\", \"BEART\", \"X5GIXIFL\"     Type & Screen (If Applicable):  No results found for: \"ABORH\", \"LABANTI\"    Drug/Infectious Status (If Applicable):  Lab Results   Component Value Date/Time    HIV Non-Reactive 01/25/2021 11:50 AM       COVID-19 Screening (If Applicable): No results found for: \"COVID19\"        Anesthesia Evaluation    Airway: Mallampati: II  TM distance: >3 FB   Neck ROM: full  Mouth opening: > = 3 FB   Dental:          Pulmonary: breath sounds clear to auscultation                             Cardiovascular:  Exercise tolerance: good (>4 METS)          Rhythm: regular  Rate: normal                    Neuro/Psych:   (+) psychiatric history:            GI/Hepatic/Renal:             Endo/Other:    (+) hypothyroidism::..                 Abdominal:             Vascular:          Other Findings:       Anesthesia Plan      MAC and general     ASA 2       Induction: intravenous.    MIPS: Postoperative opioids intended and Prophylactic antiemetics administered.  Anesthetic plan and risks discussed with patient.      Plan discussed with CRNA.                Ness Sprague MD   7/10/2025

## 2025-07-25 ENCOUNTER — OFFICE VISIT (OUTPATIENT)
Dept: GYNECOLOGY | Age: 55
End: 2025-07-25

## 2025-07-25 VITALS
HEART RATE: 75 BPM | SYSTOLIC BLOOD PRESSURE: 110 MMHG | BODY MASS INDEX: 25.06 KG/M2 | DIASTOLIC BLOOD PRESSURE: 68 MMHG | WEIGHT: 160 LBS | OXYGEN SATURATION: 100 %

## 2025-07-25 DIAGNOSIS — N95.0 PMB (POSTMENOPAUSAL BLEEDING): Primary | ICD-10-CM

## 2025-07-25 DIAGNOSIS — R93.89 ENDOMETRIAL THICKENING ON ULTRASOUND: ICD-10-CM

## 2025-07-25 DIAGNOSIS — Z79.890 POST-MENOPAUSE ON HRT (HORMONE REPLACEMENT THERAPY): ICD-10-CM

## 2025-07-25 DIAGNOSIS — Z09 POSTOP CHECK: ICD-10-CM

## 2025-07-25 DIAGNOSIS — N92.0 MENORRHAGIA WITH REGULAR CYCLE: ICD-10-CM

## 2025-07-25 DIAGNOSIS — R60.0 PEDAL EDEMA: ICD-10-CM

## 2025-07-25 NOTE — PROGRESS NOTES
The sensitive parts of the examination were performed with Blayne Gray MA as a chaperone.  Blayne was present during the entirety of the sensitive parts of the examination.

## 2025-07-25 NOTE — TELEPHONE ENCOUNTER
Medication:   Requested Prescriptions     Pending Prescriptions Disp Refills    triamterene-hydroCHLOROthiazide (MAXZIDE-25) 37.5-25 MG per tablet [Pharmacy Med Name: TRIAMT/HCTZ  TAB 37.5-25] 90 tablet 0     Sig: TAKE 1 TABLET DAILY     Last Filled:  05/05/2025    Last appt: 7/3/2025   Next appt: Visit date not found    Last OARRS:       11/24/2017     9:04 AM   RX Monitoring   Attestation The Prescription Monitoring Report for this patient was reviewed today.   Periodic Controlled Substance Monitoring Medication contract signed today.

## 2025-07-25 NOTE — PROGRESS NOTES
Chief complaint: Postop checkup    History of present illness: Sharyn Beck 54 y.o. female No LMP recorded (lmp unknown). Patient is postmenopausal.  Who presents for postoperative checkup after hysteroscopy D&C with NovaSure ablation.    She reports that she is feeling well.  She feels that recovery is going well.  She is denying disturbance of bowel or bladder function.  No fevers, chills, nausea, vomiting, or pelvic pain.    Patient Active Problem List   Diagnosis    Acquired hypothyroidism    Goiter    Anxiety    Class 1 obesity due to excess calories without serious comorbidity with body mass index (BMI) of 33.0 to 33.9 in adult    Patellofemoral arthralgia of both knees    Prediabetes    Overweight (BMI 25.0-29.9)    Gross hematuria    Benign essential microscopic hematuria    Postmenopausal estrogen deficiency    Chalazion left upper eyelid    PMB (postmenopausal bleeding)    Menorrhagia with regular cycle    Preop examination       Review of systems:  No complaints of symptoms involving:  Constitutional: negative for fever, chills.  Eyes: No change in vision, double vision, or scotomata.  HENT: No sore throat, ear pain or nasal congestion.  Respiratory: No cough, shortness of breath or hemoptysis.  Cardiovascular: No chest pain, orthopnea, fainting.  Skin: No pruritus or generalized rash.  Neurologic: No focal weakness or sensory changes.      Past medical history, past surgical history, allergies, family history, and social history are all updated in the electronic medical record and reviewed.    Past Medical History:   Diagnosis Date    Allergic rhinitis     Attention deficit disorder (ADD) without hyperactivity 12/22/2017    Blood clot in abdominal vein 07/1994    superior mesentary artery with appendicitis, sepsis    DUB (dysfunctional uterine bleeding) 01/26/2020    Goiter     Hypothyroidism     Osteoarthritis     Wears glasses      Past Surgical History:   Procedure Laterality Date    ABDOMINOPLASTY

## 2025-07-28 RX ORDER — TRIAMTERENE AND HYDROCHLOROTHIAZIDE 37.5; 25 MG/1; MG/1
1 TABLET ORAL DAILY
Qty: 90 TABLET | Refills: 0 | Status: SHIPPED | OUTPATIENT
Start: 2025-07-28

## 2025-08-02 PROBLEM — Z01.818 PREOP EXAMINATION: Status: RESOLVED | Noted: 2025-07-03 | Resolved: 2025-08-02

## (undated) DEVICE — GARMENT,MEDLINE,DVT,INT,CALF,MED, GEN2: Brand: MEDLINE

## (undated) DEVICE — SET IV EXTENSION 6IN

## (undated) DEVICE — LINER INCONT W7XL14IN PEACH POLYMER FLUF ADH WT CNTOUR DLX

## (undated) DEVICE — SET ADMIN L104IN 18ML GRAV CK VLV RLER CLMP 2 SMRTSITE NDL

## (undated) DEVICE — TUBING, SUCTION, 1/4" X 12', STRAIGHT: Brand: MEDLINE

## (undated) DEVICE — TOWEL,STOP FLAG GOLD N-W: Brand: MEDLINE

## (undated) DEVICE — SET EXTN PRIMING 4.9ML L30IN INCL SLDE CLMP SPIN M LUERLOCK

## (undated) DEVICE — PACK,LAPARASCOPY,PELVISCOPY,AURORA: Brand: MEDLINE

## (undated) DEVICE — UNDERPANTS INCONT XL 45-70IN KNIT SEAMLESS DSGN COLOR-CODED

## (undated) DEVICE — WET SKIN PREP TRAY: Brand: MEDLINE INDUSTRIES, INC.

## (undated) DEVICE — DRAPE,UNDERBUTTOCKS,PCH,STERILE: Brand: MEDLINE

## (undated) DEVICE — BAG INFUSION PRESSURE 1000 CC THMB WHL AIR CTRL DISP

## (undated) DEVICE — STRAP RESTRN W3.5XL18IN STD ALL PURP DISP W/ SLNG RNG

## (undated) DEVICE — SOLUTION IRRG H2O 3000 ML USP STRL TITAN XL CONTAINER

## (undated) DEVICE — COVER LT HNDL BLU PLAS

## (undated) DEVICE — SOLUTION INJ LR VISIV 1000ML BG

## (undated) DEVICE — JEWISH HOSPITAL TURNOVER KIT: Brand: MEDLINE INDUSTRIES, INC.

## (undated) DEVICE — GLOVE ORTHO 8   MSG9480

## (undated) DEVICE — DEVICE ABLATION NOVASUREHANDLE DISP

## (undated) DEVICE — PRESSURE TUBING: Brand: TRUWAVE

## (undated) DEVICE — PAD ADHESIVE ST TELFA  3X4IN

## (undated) DEVICE — STANDARD D&C: Brand: MEDLINE INDUSTRIES, INC.

## (undated) DEVICE — GOWN,SIRUS,NONRNF,SETINSLV,2XL,18/CS: Brand: MEDLINE